# Patient Record
Sex: FEMALE | Race: WHITE | NOT HISPANIC OR LATINO | Employment: FULL TIME | URBAN - METROPOLITAN AREA
[De-identification: names, ages, dates, MRNs, and addresses within clinical notes are randomized per-mention and may not be internally consistent; named-entity substitution may affect disease eponyms.]

---

## 2019-06-07 PROBLEM — N60.92 ATYPICAL DUCTAL HYPERPLASIA OF LEFT BREAST: Status: ACTIVE | Noted: 2019-06-07

## 2019-08-15 PROBLEM — D05.12 INTRADUCTAL CARCINOMA IN SITU OF LEFT BREAST: Status: ACTIVE | Noted: 2019-08-15

## 2019-09-04 ENCOUNTER — INITIAL CONSULT (OUTPATIENT)
Dept: HEMATOLOGY/ONCOLOGY | Facility: CLINIC | Age: 49
End: 2019-09-04
Payer: COMMERCIAL

## 2019-09-04 VITALS
BODY MASS INDEX: 22.11 KG/M2 | HEIGHT: 60 IN | SYSTOLIC BLOOD PRESSURE: 99 MMHG | TEMPERATURE: 98 F | DIASTOLIC BLOOD PRESSURE: 66 MMHG | RESPIRATION RATE: 16 BRPM | WEIGHT: 112.63 LBS | HEART RATE: 87 BPM

## 2019-09-04 DIAGNOSIS — Z90.13 S/P BILATERAL MASTECTOMY: ICD-10-CM

## 2019-09-04 DIAGNOSIS — D05.12 INTRADUCTAL CARCINOMA IN SITU OF LEFT BREAST: Primary | ICD-10-CM

## 2019-09-04 DIAGNOSIS — N60.92 ATYPICAL DUCTAL HYPERPLASIA OF LEFT BREAST: ICD-10-CM

## 2019-09-04 DIAGNOSIS — Z84.89 FAMILY HISTORY OF CARCINOMA IN SITU OF BREAST: ICD-10-CM

## 2019-09-04 PROCEDURE — 99999 PR PBB SHADOW E&M-EST. PATIENT-LVL IV: CPT | Mod: PBBFAC,,, | Performed by: INTERNAL MEDICINE

## 2019-09-04 PROCEDURE — 99205 PR OFFICE/OUTPT VISIT, NEW, LEVL V, 60-74 MIN: ICD-10-PCS | Mod: S$GLB,,, | Performed by: INTERNAL MEDICINE

## 2019-09-04 PROCEDURE — 99999 PR PBB SHADOW E&M-EST. PATIENT-LVL IV: ICD-10-PCS | Mod: PBBFAC,,, | Performed by: INTERNAL MEDICINE

## 2019-09-04 PROCEDURE — 3008F BODY MASS INDEX DOCD: CPT | Mod: CPTII,S$GLB,, | Performed by: INTERNAL MEDICINE

## 2019-09-04 PROCEDURE — 3008F PR BODY MASS INDEX (BMI) DOCUMENTED: ICD-10-PCS | Mod: CPTII,S$GLB,, | Performed by: INTERNAL MEDICINE

## 2019-09-04 PROCEDURE — 99205 OFFICE O/P NEW HI 60 MIN: CPT | Mod: S$GLB,,, | Performed by: INTERNAL MEDICINE

## 2019-09-04 RX ORDER — CLINDAMYCIN HYDROCHLORIDE 300 MG/1
CAPSULE ORAL
Refills: 0 | COMMUNITY
Start: 2019-08-23 | End: 2021-10-13

## 2019-09-04 NOTE — LETTER
September 4, 2019      Lucia Lancaster MD  [address] [facility] [address]           [facility] [address] [facility] [address]  [facility] [address]  Phone: [phone]  Fax: [phone]          Patient: Jenny Baugh   MR Number: [id number]   YOB: 1970   Date of Visit: 9/4/2019       Dear Dr. Lucia Lancaster:    Thank you for referring Jenny Baugh to me for evaluation. Attached you will find relevant portions of my assessment and plan of care.    If you have questions, please do not hesitate to call me. I look forward to following Jenny Baugh along with you.    Sincerely,    Ninoska Saleh MD    Enclosure  CC:  No Recipients    If you would like to receive this communication electronically, please contact [email or web address] or [phone] to request more information on [employer] access.    For providers and/or their staff who would like to refer a patient to [facility], please contact us through our one-stop-shop provider referral line, [facility], at [phone].    If you feel you have received this communication in error or would no longer like to receive these types of communications, please e-mail [email or web address]

## 2019-09-04 NOTE — PROGRESS NOTES
INITIAL CONSULTATION     DATE: 2019  Patient Name: Jenny Baugh  Referred by: Lucia Lancaster,*  Reason for referral: DCIS, ALH      Subjective:       Patient ID: Jenny Baugh is a 48 y.o. female.    Chief Complaint: Cancer (breast )    HPI    16 - Breast MRI 1.2cm well defined mass oter centeral left posterior - R breast. No biopsy done at that time.   19 - suspicious calcs in UOQ L breast. MMG screen  19 - MMG diag L with angie - new 6 mm round, punctacte calc in a tightly clustered distribution in UOQ L breast.    19 - LUQ posterior breast core needle biopsies - flat epitehial atypia, calcs present, no maligancy.    She reports hematoma after the biopsy so MRI was delayed.   19 - MRI breast bilateral - no masses, enhancements, or other abnormal findings are seen. Benign - 2  19 - wide excision core needle biopsy -    Path - DCIS ER negative. Low grade. No necrosis. Negative but close margin 1mm. LCIS with negative margins.   19 - genetic testing - VUS - MIKE and PTCH1.     8/15/19 - Bilateral mastectomies nipple sparing with L SLND  19 - path:atypical lobular hyperplasia, 2 benign LN. ALH is noted on right and left retroareolar excision as well as bilateral breasts.     PMHx:   and no major med problems. 3 pregnancies and children.  Chronic constipation and dependent on laxative once a week. She has no regular meds otherwise. Pain pills gave her a HA.   No chronic pain except infrequent sciatic n and related to exercise.   Exercise includes running, lift weights, kickboxing, HIT.     Works full time -  for, sedentary job. Good stamina.     Menstrual periods - She stopped OCP before surgery 19. Menstruation then started early and then she now hasn't had a period since July. Plan is to see her gyn to discuss, we reviewed today age and risk for VTE on OCP and risk as related to cancer which I think is overall very low  here. famly     Family history - sister had DCIS at 47yo and had mastectomy ipsilateral. ER+. This was approximately . Also has abnormality on kidney that is being worked up. She had vulvar cancer and surgery for that. She is diabetic.   Parents did not have cancer - father had emphysema after tobacco abuse and  at age 63, and mother was diabetic and had CAD and CABG, she  at 66yo. She has one brother who  12yo from thalassemia. She had another brother with a brain aneurysm in his 50's. Third brother had a stroke recently after back surgery at 60yo.     Maternal first cousin in 40's had DCIS. Maternal grandfather  of colon cancer.      No smoking, social drinking on weekends  Exercise regularly except for with surgery.     Past Medical History:   Diagnosis Date    Chronic constipation     General anesthetics causing adverse effect in therapeutic use     Respiratory distress     sister    Sciatica      Past Surgical History:   Procedure Laterality Date    BIOPSY, LYMPH NODE, SENTINEL Left 8/15/2019    [facility] at [facility]     SECTION      EXCISION, MASS, BREAST/ 2:00 with Needle loc Left 2019    Performed by Lucia Lancaster [facility]    MASTECTOMY, SIMPLE - Nipple Sparing Bilateral 8/15/2019    [facility] at [facility]    RECONSTRUCTION, BREAST - with Immediate Silicone Implants Bilateral 8/15/2019    Performed by Thang White MD at [facility] OR     Social History     Socioeconomic History    Marital status:      Spouse name: Not on file    Number of children: Not on file    Years of education: Not on file    Highest education level: Not on file   Occupational History    Not on file   Social Needs    Financial resource strain: Not on file    Food insecurity:     Worry: Not on file     Inability: Not on file    Transportation needs:     Medical: Not on file     Non-medical: Not on file   Tobacco Use     Smoking status: Former Smoker     Last attempt to quit:      Years since quittin.6    Smokeless tobacco: Never Used   Substance and Sexual Activity    Alcohol use: Yes     Frequency: Never     Comment: socially    Drug use: No    Sexual activity: Not on file   Lifestyle    Physical activity:     Days per week: Not on file     Minutes per session: Not on file    Stress: Not on file   Relationships    Social connections:     Talks on phone: Not on file     Gets together: Not on file     Attends Mandaeism service: Not on file     Active member of club or organization: Not on file     Attends meetings of clubs or organizations: Not on file     Relationship status: Not on file   Other Topics Concern    Not on file   Social History Narrative    Not on file     Family History   Problem Relation Age of Onset    Breast cancer Sister 48    Diabetes Mother     Heart disease Mother     Emphysema Father     Diabetes Father        Current Outpatient Medications   Medication Sig Dispense Refill    bisacodyl (DULCOLAX) 5 mg EC tablet Take 5 mg by mouth once a week.      clindamycin (CLEOCIN) 300 MG capsule TK ONE C PO  TID  0    HYDROcodone-acetaminophen (NORCO) 5-325 mg per tablet Take 1 tablet by mouth every 4 to 6 hours as needed for Pain. 40 tablet 0     No current facility-administered medications for this visit.      Facility-Administered Medications Ordered in Other Visits   Medication Dose Route Frequency Provider Last Rate Last Dose    lactated ringers infusion   Intravenous Continuous Piero Chin MD 0 mL/hr at 19 1350       ALLERGIES REVIEWED    Review of Systems   Constitutional: Negative for activity change, appetite change, fatigue, fever and unexpected weight change.   HENT: Negative for congestion, mouth sores, rhinorrhea, sore throat and trouble swallowing.    Eyes: Negative for pain and visual disturbance.   Respiratory: Negative for cough, shortness of breath and  wheezing.    Cardiovascular: Negative for chest pain, palpitations and leg swelling.   Gastrointestinal: Negative for abdominal distention, abdominal pain, blood in stool, constipation, diarrhea, nausea and vomiting.   Genitourinary: Negative for difficulty urinating, dysuria, frequency, hematuria and urgency.   Musculoskeletal: Negative for arthralgias, back pain, joint swelling and myalgias.   Skin: Negative for rash and wound.   Neurological: Negative for dizziness, speech difficulty, weakness, light-headedness, numbness and headaches.   Hematological: Negative for adenopathy. Does not bruise/bleed easily.   Psychiatric/Behavioral: Negative for confusion. The patient is not nervous/anxious.          Objective:      BP 99/66   Pulse 87   Temp 97.8 °F (36.6 °C)   Resp 16   Ht 5' (1.524 m)   Wt 51.1 kg (112 lb 10.5 oz)   BMI 22.00 kg/m²    Wt Readings from Last 25 Encounters:   09/04/19 51.1 kg (112 lb 10.5 oz)   08/15/19 48.1 kg (106 lb 0.7 oz)   08/13/19 48.1 kg (106 lb 0.7 oz)   06/07/19 50.5 kg (111 lb 5.3 oz)   06/07/19 49.9 kg (110 lb)   02/05/19 47.6 kg (105 lb)       Physical Exam   Constitutional: She is oriented to person, place, and time. She appears well-developed and well-nourished. No distress.   HENT:   Head: Normocephalic and atraumatic.   Nose: Nose normal.   Mouth/Throat: Oropharynx is clear and moist. No oropharyngeal exudate.   Eyes: Conjunctivae and EOM are normal. No scleral icterus.   Cardiovascular: Normal rate, regular rhythm, normal heart sounds and intact distal pulses.   No murmur heard.  Pulmonary/Chest: Effort normal and breath sounds normal. No respiratory distress. She has no wheezes. She has no rales. Right breast exhibits no inverted nipple, no mass, no skin change and no tenderness. Left breast exhibits no inverted nipple, no mass, no skin change and no tenderness. No breast swelling or tenderness.   S/p bilateral mastectomies with reconstruction   Abdominal: Soft. Bowel  sounds are normal. She exhibits no distension. There is no tenderness.   Genitourinary: No breast swelling or tenderness.   Musculoskeletal: Normal range of motion. She exhibits no edema.   Lymphadenopathy:     She has no cervical adenopathy.   Neurological: She is alert and oriented to person, place, and time. No cranial nerve deficit.   Skin: Skin is warm and dry. No rash noted.   Psychiatric: She has a normal mood and affect. Her behavior is normal. Thought content normal.             Assessment:       1. Intraductal carcinoma in situ of left breast    2. Atypical ductal hyperplasia of left breast    3. S/P bilateral mastectomy    4. Family history of carcinoma in situ of breast      Cancer Staging  Intraductal carcinoma in situ of left breast  Staging form: Breast, AJCC 8th Edition  - Pathologic stage from 9/4/2019: Stage 0 (pTis (DCIS), pN0, cM0, ER-, AR-, HER2: Not Assessed) - Unsigned    ECOG SCORE         ECOG 0    Patient is a  48 y.o. female here with new diagnosis DCIS s/p bilateral nipple-sparing mastectomies and found to have atypical lobular hyperplasia including margin at nipples.    Discussed diagnosis, staging, receptor status, various treatments including surgery, chemotherapy, radiation, endocrine therapy. Discussed treatment plan and benefits/risks, rationale for recommendations.     Since she is s/p bilateral mastectomies, she has overall very low risk of recurrence of any malignant breast pathology. we discussed risk reduction with oral therapies tamoxifen or AI and context of the clinical trials for ALH and for DCIS that showed benefit and as this relates to her (and her DCIS is ER-). We discussed in detail side effect profile of tamoxifen and benefit/risk ratio. She likely has very low margin of benefit with tamoxifen versus the risk of SE. We discussed if she did have development of DCIS or invasive cancer as would be likely related to nipple-areola and surveillance for this, SBE, continuing  follow-up with surgeon and excision if this occurred, imaging as indicated, again very low risk for any development of more advanced cancer. Discussed consider further surgery to remove nipples which she declines, I think this is a reasonable decision. She expressed good understanding of all above rationale.     Reviewed her family history and genetic testing in detail.     Discussed benefits of exercise, active lifestyle, and healthy body weight (BMI 20-25) in decreasing risk of breast cancer recurrence and advised exercise and diet. She expressed understanding as has healthy lifestyle frequent exercise prior to surgeries this year, very motivated to restart when cleared which is great.           Plan:       Jenny Shaver was seen today for cancer.    Diagnoses and all orders for this visit:    Intraductal carcinoma in situ of left breast    Atypical ductal hyperplasia of left breast    S/P bilateral mastectomy    Family history of carcinoma in situ of breast            PLAN:     MD follow-up here prn.     Important to continue follow-up with surgery for surveillance.    Exercise/nutrition    Important to keep follow-up with PCP and gyn as well and she will discuss OCP and benefit/risk with her gyn.    Discussed plan above in great detail with patient and all questions answered to her satisfaction. Proceed with plan above.       Thank you for the referral. Please call with any questions.           Ninoska Saleh M.D.  Hematology Oncology  [facility]

## 2020-07-02 ENCOUNTER — OFFICE VISIT (OUTPATIENT)
Dept: URGENT CARE | Facility: CLINIC | Age: 50
End: 2020-07-02
Payer: COMMERCIAL

## 2020-07-02 VITALS — TEMPERATURE: 99 F

## 2020-09-08 ENCOUNTER — OFFICE VISIT (OUTPATIENT)
Dept: URGENT CARE | Facility: CLINIC | Age: 50
End: 2020-09-08
Payer: COMMERCIAL

## 2020-09-08 VITALS
WEIGHT: 112.63 LBS | DIASTOLIC BLOOD PRESSURE: 62 MMHG | HEIGHT: 60 IN | OXYGEN SATURATION: 98 % | HEART RATE: 88 BPM | BODY MASS INDEX: 22.11 KG/M2 | SYSTOLIC BLOOD PRESSURE: 106 MMHG | RESPIRATION RATE: 18 BRPM | TEMPERATURE: 98 F

## 2020-09-08 DIAGNOSIS — R09.82 PND (POST-NASAL DRIP): ICD-10-CM

## 2020-09-08 DIAGNOSIS — R05.9 COUGH: ICD-10-CM

## 2020-09-08 DIAGNOSIS — R09.81 SINUS CONGESTION: Primary | ICD-10-CM

## 2020-09-08 LAB
CTP QC/QA: YES
SARS-COV-2 RDRP RESP QL NAA+PROBE: NEGATIVE

## 2020-09-08 PROCEDURE — 96372 THER/PROPH/DIAG INJ SC/IM: CPT | Mod: S$GLB,,, | Performed by: FAMILY MEDICINE

## 2020-09-08 PROCEDURE — 96372 PR INJECTION,THERAP/PROPH/DIAG2ST, IM OR SUBCUT: ICD-10-PCS | Mod: S$GLB,,, | Performed by: FAMILY MEDICINE

## 2020-09-08 PROCEDURE — 99204 OFFICE O/P NEW MOD 45 MIN: CPT | Mod: 25,S$GLB,, | Performed by: PHYSICIAN ASSISTANT

## 2020-09-08 PROCEDURE — 99204 PR OFFICE/OUTPT VISIT, NEW, LEVL IV, 45-59 MIN: ICD-10-PCS | Mod: 25,S$GLB,, | Performed by: PHYSICIAN ASSISTANT

## 2020-09-08 PROCEDURE — U0002: ICD-10-PCS | Mod: QW,S$GLB,, | Performed by: PHYSICIAN ASSISTANT

## 2020-09-08 PROCEDURE — U0002 COVID-19 LAB TEST NON-CDC: HCPCS | Mod: QW,S$GLB,, | Performed by: PHYSICIAN ASSISTANT

## 2020-09-08 RX ORDER — AZELASTINE 1 MG/ML
1 SPRAY, METERED NASAL 2 TIMES DAILY
Qty: 30 ML | Refills: 3 | Status: SHIPPED | OUTPATIENT
Start: 2020-09-08 | End: 2021-10-13

## 2020-09-08 RX ORDER — DEXAMETHASONE SODIUM PHOSPHATE 100 MG/10ML
10 INJECTION INTRAMUSCULAR; INTRAVENOUS ONCE
Status: COMPLETED | OUTPATIENT
Start: 2020-09-08 | End: 2020-09-08

## 2020-09-08 RX ADMIN — DEXAMETHASONE SODIUM PHOSPHATE 10 MG: 100 INJECTION INTRAMUSCULAR; INTRAVENOUS at 01:09

## 2020-09-08 NOTE — PATIENT INSTRUCTIONS
Sinusitis (No Antibiotics)    The sinuses are air-filled spaces within the bones of the face. They connect to the inside of the nose. Sinusitis is an inflammation of the tissue lining the sinus cavity. Sinus inflammation can occur during a cold. It can also be due to allergies to pollens and other particles in the air. It can cause symptoms such as sinus congestion, headache, sore throat, facial swelling and fullness. It may also cause a low-grade fever. No infection is present, and no antibiotic treatment is needed.  Home care  · Drink plenty of water, hot tea, and other liquids. This may help thin mucus. It also may promote sinus drainage.  · Heat may help soothe painful areas of the face. Use a towel soaked in hot water. Or,  the shower and direct the hot spray onto your face. Using a vaporizer along with a menthol rub at night may also help.   · An expectorant containing guaifenesin may help thin the mucus and promote drainage from the sinuses.  · Over-the-counter decongestants may be used unless a similar medicine was prescribed. Nasal sprays work the fastest. Use one that contains phenylephrine or oxymetazoline. First blow the nose gently. Then use the spray. Do not use these medicines more often than directed on the label or symptoms may get worse. You may also use tablets containing pseudoephedrine. Avoid products that combine ingredients, because side effects may be increased. Read labels. You can also ask the pharmacist for help. (NOTE: Persons with high blood pressure should not use decongestants. They can raise blood pressure.)  · Over-the-counter antihistamines may help if allergies contributed to your sinusitis.    · Use acetaminophen or ibuprofen to control pain, unless another pain medicine was prescribed. (If you have chronic liver or kidney disease or ever had a stomach ulcer, talk with your doctor before using these medicines. Aspirin should never be used in anyone under 18 years of age  "who is ill with a fever. It may cause severe liver damage.)  · Use nasal rinses or irrigation as instructed by your health care provider.  · Don't smoke. This can worsen symptoms.  Follow-up care  Follow up with your healthcare provider or our staff if you are not improving within the next week.  When to seek medical advice  Call your healthcare provider if any of these occur:  · Green or yellow discharge from the nose or into the throat  · Facial pain or headache becoming more severe  · Stiff neck  · Unusual drowsiness or confusion  · Swelling of the forehead or eyelids  · Vision problems, including blurred or double vision  · Fever of 100.4ºF (38ºC) or higher, or as directed by your healthcare provider  · Seizure  · Breathing problems  · Symptoms not resolving within 10 days  Date Last Reviewed: 4/13/2015  © 0401-7421 [employer]. [address] [place] [address]. All rights reserved. This information is not intended as a substitute for professional medical care. Always follow your healthcare professional's instructions.    NASAL ALLERGY    Nasal Allergy, also called "Allergic Rhinitis" occurs after exposure to pollen, molds, mildew, animal "dander" (scales from animal skin, hair and feathers), dust, smoke and fumes. (These are called "allergens"). When pollen causes a nasal allergy it is commonly called "Hay Fever".    When these particles contact the lining of the nose, eyes, eyelids, sinuses or throat, they cause the cells to release a chemical called "histamine". Histamine may cause a watery discharge from the eyes or nose. It may also cause violent sneezing, nasal congestion, itching of the eyes, nose, throat and mouth.    PREVENTION:    Nasal allergy cannot be cured but symptoms can be reduced. Avoid or reduce exposure to the allergen when possible.    HOME CARE:    1) DECONGESTANT pills and sprays (Sudafed, NeoSynephrine), reduce tissue swelling and watery discharge. Overuse of nasal " decongestant sprays may make symptoms worse, ESPECIALLY IF YOU HAVE HIGH BLOOD PRESSURE. Do not use these more often than recommended. Get an over the counter Nasal Saline spray to supplement Flonase    2) ANTIHISTAMINES block the release of histamine during the allergic response. Antihistamines are more effective when taken BEFORE symptoms develop. Unless a prescription antihistamine was prescribed, you may take CLARITIN (loratadine). (Claritin is an over-the-counter antihistamine that does not cause drowsiness.) You can also consider Xyzal, Allegra, Zyrtec.    3) STEROID nasal sprays (Beconase, Vancenase, Nasalide, Nasocort, Flonase) or oral steroids (Prednisone) may also be prescribed for more severe symptoms. These help to reduce the local inflammation which adds to the allergic response.    4) If you have ASTHMA, pollen season may make your asthma symptoms worse. It is important that you use your asthma medicines as directed during this time to prevent or treat attacks. Some persons with asthma have a worsening of their asthma symptoms when taking antihistamines. If you notice this, stop the antihistamines and notify your doctor.    5) Consider a Chitto Rhino Nasal and Sinus Rinse 2-3 times/week if your symptoms are chronic. ([email or web address])    Thank you for enrolling in [facility]. Please follow the instructions below to securely access your online medical record. [employer] allows you to send messages to your doctor, view your test results, renew your prescriptions, schedule appointments, and more.     How Do I Sign Up?  1. In your Internet browser, go to [email or web address].  2. In the lower right of the page, click the Activate Now link located under the Have Access Code? Title.  3. Enter your [facility] Access Code exactly as it appears below. You will not need to use this code after youve completed the sign-up process. If you do not sign up before the expiration date, you must request a new  code.  [facility] Access Code: Activation code not generated  Current Patient Portal Status: Active    4. Enter Date of Birth (mm/dd/yyyy) as indicated and click the Next button. You will be taken to the next sign-up page.  5. Create a [facility] ID. This will be your new [facility] login ID and cannot be changed, so think of one that is secure and easy to remember.  6. Create a [facility] password.  Your password must be at least 8 characters long and contain at least 1 letter and 1 number.  You can change your password at any time.  7. Enter your Password Reset Question and Answer, then click the Next button.   8. Enter your e-mail address. You will receive e-mail notification when new information is available in [facility].  9. Click Sign Up. You can now view your medical record.     Additional Information  If you have questions, you can email [email or web address] or call [phone]  to talk to our [facility] staff. Remember, [facility] is NOT to be used for urgent needs. For medical emergencies, dial 911.     If not allergic,take tylenol (acetominophen) for fever control, chills, or body aches every 4 hours. Do not exceed 4000 mg/ day.If not allergic, take Motrin (Ibuprofen) every 4 hours for fever, chills, pain or inflammation. Do not exceed 2400 mg/day. You can alternate taking tylenol and motrin.    If you were prescribed a narcotic medication, do not drive or operate heavy equipment or machinery while taking these medications.  You must understand that you've received an Urgent Care treatment only and that you may be released before all your medical problems are known or treated. You, the patient, will arrange for follow up care as instructed.  Follow up with your PCP or specialty clinic as directed in the next 1-2 weeks if not improved or as needed.  You can call [phone] to schedule an appointment with the appropriate provider.  If your condition worsens we recommend that you receive another evaluation  at the emergency room immediately or contact your primary medical clinics after hours call service to discuss your concerns.    Please return here or go to the Emergency Department for any concerns or worsening of condition.    If you have been referred to another provider and wish to call to check on the status of your referral, please call [facility] at [phone]

## 2020-09-08 NOTE — PROGRESS NOTES
Subjective:       Patient ID: Jenny Baugh is a 49 y.o. female.    Vitals:  height is 5' (1.524 m) and weight is 51.1 kg (112 lb 10.5 oz). Her temperature is 98.1 °F (36.7 °C). Her blood pressure is 106/62 and her pulse is 88. Her respiration is 18 and oxygen saturation is 98%.     Chief Complaint: Sinus congestion     Pt presents to the clinic today for sinus problems, x Sunday. Pt c/o congestion, cough, and fatigue. Pt is taking OTC cold meds.     Sinus Problem  This is a new problem. The current episode started in the past 7 days. The problem is unchanged. There has been no fever. Her pain is at a severity of 0/10. She is experiencing no pain. Associated symptoms include congestion, coughing and sinus pressure. Pertinent negatives include no chills, diaphoresis, ear pain, headaches, shortness of breath or sore throat. Past treatments include oral decongestants. The treatment provided no relief.       Constitution: Negative for chills, sweating, fatigue and fever.   HENT: Positive for congestion and sinus pressure. Negative for ear pain, sinus pain, sore throat and voice change.    Neck: Negative for painful lymph nodes.   Eyes: Negative for eye redness.   Respiratory: Positive for cough. Negative for chest tightness, sputum production, bloody sputum, COPD, shortness of breath, stridor, wheezing and asthma.    Gastrointestinal: Negative for nausea, vomiting and diarrhea.   Musculoskeletal: Positive for muscle ache.   Skin: Negative for rash.   Allergic/Immunologic: Negative for seasonal allergies and asthma.   Neurological: Negative for headaches.   Hematologic/Lymphatic: Negative for swollen lymph nodes.       Objective:      Physical Exam   Constitutional: She is oriented to person, place, and time. She appears well-developed. She is cooperative.  Non-toxic appearance. She does not appear ill. No distress.   HENT:   Head: Normocephalic and atraumatic.   Ears:   Right Ear: Hearing, tympanic membrane,  "external ear and ear canal normal.   Left Ear: Hearing, tympanic membrane, external ear and ear canal normal.   Nose: No mucosal edema, rhinorrhea or nasal deformity. No epistaxis. Right sinus exhibits maxillary sinus tenderness. Right sinus exhibits no frontal sinus tenderness. Left sinus exhibits maxillary sinus tenderness (mild "soreness"). Left sinus exhibits no frontal sinus tenderness.   Mouth/Throat: Uvula is midline, oropharynx is clear and moist and mucous membranes are normal. No trismus in the jaw. Normal dentition. No uvula swelling. Cobblestoning present. No oropharyngeal exudate, posterior oropharyngeal edema or posterior oropharyngeal erythema.   Eyes: Conjunctivae and lids are normal. No scleral icterus.   Neck: Trachea normal, full passive range of motion without pain and phonation normal. Neck supple. No neck rigidity. No edema and no erythema present.   Cardiovascular: Normal rate, regular rhythm, normal heart sounds and normal pulses.   Pulmonary/Chest: Effort normal and breath sounds normal. No respiratory distress. She has no decreased breath sounds. She has no rhonchi.   Abdominal: Normal appearance.   Musculoskeletal: Normal range of motion.         General: No deformity.   Neurological: She is alert and oriented to person, place, and time. She exhibits normal muscle tone. Coordination normal.   Skin: Skin is warm, dry, intact, not diaphoretic and not pale. Psychiatric: Her speech is normal and behavior is normal. Judgment and thought content normal.   Nursing note and vitals reviewed.        Assessment:       1. Sinus congestion    2. Cough    3. PND (post-nasal drip)        Plan:         Sinus congestion  -     dexamethasone injection 10 mg  -     azelastine (ASTELIN) 137 mcg (0.1 %) nasal spray; 1 spray (137 mcg total) by Nasal route 2 (two) times daily.  Dispense: 30 mL; Refill: 3  -     sodium chloride (OCEAN NASAL) 0.65 % nasal spray; 1 spray by Nasal route as needed for Congestion.  " Dispense: 45 mL; Refill: 3    Cough  -     POCT COVID-19 Rapid Screening    PND (post-nasal drip)    All applicable EKG, medical records, labs, imaging reviewed in Epic and discussed with patient.     poct COVID (-)     Patient Instructions     Sinusitis (No Antibiotics)    The sinuses are air-filled spaces within the bones of the face. They connect to the inside of the nose. Sinusitis is an inflammation of the tissue lining the sinus cavity. Sinus inflammation can occur during a cold. It can also be due to allergies to pollens and other particles in the air. It can cause symptoms such as sinus congestion, headache, sore throat, facial swelling and fullness. It may also cause a low-grade fever. No infection is present, and no antibiotic treatment is needed.  Home care  · Drink plenty of water, hot tea, and other liquids. This may help thin mucus. It also may promote sinus drainage.  · Heat may help soothe painful areas of the face. Use a towel soaked in hot water. Or,  the shower and direct the hot spray onto your face. Using a vaporizer along with a menthol rub at night may also help.   · An expectorant containing guaifenesin may help thin the mucus and promote drainage from the sinuses.  · Over-the-counter decongestants may be used unless a similar medicine was prescribed. Nasal sprays work the fastest. Use one that contains phenylephrine or oxymetazoline. First blow the nose gently. Then use the spray. Do not use these medicines more often than directed on the label or symptoms may get worse. You may also use tablets containing pseudoephedrine. Avoid products that combine ingredients, because side effects may be increased. Read labels. You can also ask the pharmacist for help. (NOTE: Persons with high blood pressure should not use decongestants. They can raise blood pressure.)  · Over-the-counter antihistamines may help if allergies contributed to your sinusitis.    · Use acetaminophen or ibuprofen to  "control pain, unless another pain medicine was prescribed. (If you have chronic liver or kidney disease or ever had a stomach ulcer, talk with your doctor before using these medicines. Aspirin should never be used in anyone under 18 years of age who is ill with a fever. It may cause severe liver damage.)  · Use nasal rinses or irrigation as instructed by your health care provider.  · Don't smoke. This can worsen symptoms.  Follow-up care  Follow up with your healthcare provider or our staff if you are not improving within the next week.  When to seek medical advice  Call your healthcare provider if any of these occur:  · Green or yellow discharge from the nose or into the throat  · Facial pain or headache becoming more severe  · Stiff neck  · Unusual drowsiness or confusion  · Swelling of the forehead or eyelids  · Vision problems, including blurred or double vision  · Fever of 100.4ºF (38ºC) or higher, or as directed by your healthcare provider  · Seizure  · Breathing problems  · Symptoms not resolving within 10 days  Date Last Reviewed: 4/13/2015 © 2000-2017 [employer]. [address] [place] [address]. All rights reserved. This information is not intended as a substitute for professional medical care. Always follow your healthcare professional's instructions.    NASAL ALLERGY    Nasal Allergy, also called "Allergic Rhinitis" occurs after exposure to pollen, molds, mildew, animal "dander" (scales from animal skin, hair and feathers), dust, smoke and fumes. (These are called "allergens"). When pollen causes a nasal allergy it is commonly called "Hay Fever".    When these particles contact the lining of the nose, eyes, eyelids, sinuses or throat, they cause the cells to release a chemical called "histamine". Histamine may cause a watery discharge from the eyes or nose. It may also cause violent sneezing, nasal congestion, itching of the eyes, nose, throat and mouth.    PREVENTION:    Nasal " allergy cannot be cured but symptoms can be reduced. Avoid or reduce exposure to the allergen when possible.    HOME CARE:    1) DECONGESTANT pills and sprays (Sudafed, NeoSynephrine), reduce tissue swelling and watery discharge. Overuse of nasal decongestant sprays may make symptoms worse, ESPECIALLY IF YOU HAVE HIGH BLOOD PRESSURE. Do not use these more often than recommended. Get an over the counter Nasal Saline spray to supplement Flonase    2) ANTIHISTAMINES block the release of histamine during the allergic response. Antihistamines are more effective when taken BEFORE symptoms develop. Unless a prescription antihistamine was prescribed, you may take CLARITIN (loratadine). (Claritin is an over-the-counter antihistamine that does not cause drowsiness.) You can also consider Xyzal, Allegra, Zyrtec.    3) STEROID nasal sprays (Beconase, Vancenase, Nasalide, Nasocort, Flonase) or oral steroids (Prednisone) may also be prescribed for more severe symptoms. These help to reduce the local inflammation which adds to the allergic response.    4) If you have ASTHMA, pollen season may make your asthma symptoms worse. It is important that you use your asthma medicines as directed during this time to prevent or treat attacks. Some persons with asthma have a worsening of their asthma symptoms when taking antihistamines. If you notice this, stop the antihistamines and notify your doctor.    5) Consider a Ch[place] Rhino Nasal and Sinus Rinse 2-3 times/week if your symptoms are chronic. ([email or web address])    Thank you for enrolling in [facility]. Please follow the instructions below to securely access your online medical record. [employer] allows you to send messages to your doctor, view your test results, renew your prescriptions, schedule appointments, and more.     How Do I Sign Up?  1. In your Internet browser, go to [email or web address].  2. In the lower right of the page, click the Activate Now link located under the Have  Access Code? Title.  3. Enter your [facility] Access Code exactly as it appears below. You will not need to use this code after youve completed the sign-up process. If you do not sign up before the expiration date, you must request a new code.  [facility] Access Code: Activation code not generated  Current Patient Portal Status: Active    4. Enter Date of Birth (mm/dd/yyyy) as indicated and click the Next button. You will be taken to the next sign-up page.  5. Create a [facility] ID. This will be your new [facility] login ID and cannot be changed, so think of one that is secure and easy to remember.  6. Create a [facility] password.  Your password must be at least 8 characters long and contain at least 1 letter and 1 number.  You can change your password at any time.  7. Enter your Password Reset Question and Answer, then click the Next button.   8. Enter your e-mail address. You will receive e-mail notification when new information is available in [facility].  9. Click Sign Up. You can now view your medical record.     Additional Information  If you have questions, you can email [email or web address] or call [phone]  to talk to our [facility] staff. Remember, [facility] is NOT to be used for urgent needs. For medical emergencies, dial 911.     If not allergic,take tylenol (acetominophen) for fever control, chills, or body aches every 4 hours. Do not exceed 4000 mg/ day.If not allergic, take Motrin (Ibuprofen) every 4 hours for fever, chills, pain or inflammation. Do not exceed 2400 mg/day. You can alternate taking tylenol and motrin.    If you were prescribed a narcotic medication, do not drive or operate heavy equipment or machinery while taking these medications.  You must understand that you've received an Urgent Care treatment only and that you may be released before all your medical problems are known or treated. You, the patient, will arrange for follow up care as instructed.  Follow up with your PCP or  specialty clinic as directed in the next 1-2 weeks if not improved or as needed.  You can call [phone] to schedule an appointment with the appropriate provider.  If your condition worsens we recommend that you receive another evaluation at the emergency room immediately or contact your primary medical clinics after hours call service to discuss your concerns.    Please return here or go to the Emergency Department for any concerns or worsening of condition.    If you have been referred to another provider and wish to call to check on the status of your referral, please call [facility] Sched[facility] at [phone]

## 2020-09-25 PROBLEM — D05.02 LOBULAR CARCINOMA IN SITU OF LEFT BREAST: Status: ACTIVE | Noted: 2020-09-25

## 2020-09-25 PROBLEM — Z80.3 FAMILY HISTORY OF BREAST CANCER: Status: ACTIVE | Noted: 2020-09-25

## 2021-05-10 ENCOUNTER — PATIENT MESSAGE (OUTPATIENT)
Dept: RESEARCH | Facility: HOSPITAL | Age: 51
End: 2021-05-10

## 2021-09-22 ENCOUNTER — OFFICE VISIT (OUTPATIENT)
Dept: NEUROSURGERY | Facility: CLINIC | Age: 51
End: 2021-09-22
Payer: COMMERCIAL

## 2021-09-22 VITALS
HEIGHT: 60 IN | BODY MASS INDEX: 21.99 KG/M2 | DIASTOLIC BLOOD PRESSURE: 60 MMHG | WEIGHT: 112 LBS | SYSTOLIC BLOOD PRESSURE: 114 MMHG

## 2021-09-22 DIAGNOSIS — R29.2 HYPERREFLEXIA: ICD-10-CM

## 2021-09-22 DIAGNOSIS — M54.12 CERVICAL RADICULOPATHY: Primary | ICD-10-CM

## 2021-09-22 PROCEDURE — 99999 PR PBB SHADOW E&M-EST. PATIENT-LVL III: ICD-10-PCS | Mod: PBBFAC,,, | Performed by: PHYSICIAN ASSISTANT

## 2021-09-22 PROCEDURE — 3008F BODY MASS INDEX DOCD: CPT | Mod: CPTII,S$GLB,, | Performed by: PHYSICIAN ASSISTANT

## 2021-09-22 PROCEDURE — 99999 PR PBB SHADOW E&M-EST. PATIENT-LVL III: CPT | Mod: PBBFAC,,, | Performed by: PHYSICIAN ASSISTANT

## 2021-09-22 PROCEDURE — 1159F MED LIST DOCD IN RCRD: CPT | Mod: CPTII,S$GLB,, | Performed by: PHYSICIAN ASSISTANT

## 2021-09-22 PROCEDURE — 99204 OFFICE O/P NEW MOD 45 MIN: CPT | Mod: S$GLB,,, | Performed by: PHYSICIAN ASSISTANT

## 2021-09-22 PROCEDURE — 1160F PR REVIEW ALL MEDS BY PRESCRIBER/CLIN PHARMACIST DOCUMENTED: ICD-10-PCS | Mod: CPTII,S$GLB,, | Performed by: PHYSICIAN ASSISTANT

## 2021-09-22 PROCEDURE — 3078F DIAST BP <80 MM HG: CPT | Mod: CPTII,S$GLB,, | Performed by: PHYSICIAN ASSISTANT

## 2021-09-22 PROCEDURE — 3074F PR MOST RECENT SYSTOLIC BLOOD PRESSURE < 130 MM HG: ICD-10-PCS | Mod: CPTII,S$GLB,, | Performed by: PHYSICIAN ASSISTANT

## 2021-09-22 PROCEDURE — 1160F RVW MEDS BY RX/DR IN RCRD: CPT | Mod: CPTII,S$GLB,, | Performed by: PHYSICIAN ASSISTANT

## 2021-09-22 PROCEDURE — 3074F SYST BP LT 130 MM HG: CPT | Mod: CPTII,S$GLB,, | Performed by: PHYSICIAN ASSISTANT

## 2021-09-22 PROCEDURE — 3078F PR MOST RECENT DIASTOLIC BLOOD PRESSURE < 80 MM HG: ICD-10-PCS | Mod: CPTII,S$GLB,, | Performed by: PHYSICIAN ASSISTANT

## 2021-09-22 PROCEDURE — 3008F PR BODY MASS INDEX (BMI) DOCUMENTED: ICD-10-PCS | Mod: CPTII,S$GLB,, | Performed by: PHYSICIAN ASSISTANT

## 2021-09-22 PROCEDURE — 1159F PR MEDICATION LIST DOCUMENTED IN MEDICAL RECORD: ICD-10-PCS | Mod: CPTII,S$GLB,, | Performed by: PHYSICIAN ASSISTANT

## 2021-09-22 PROCEDURE — 99204 PR OFFICE/OUTPT VISIT, NEW, LEVL IV, 45-59 MIN: ICD-10-PCS | Mod: S$GLB,,, | Performed by: PHYSICIAN ASSISTANT

## 2021-10-08 ENCOUNTER — TELEPHONE (OUTPATIENT)
Dept: NEUROSURGERY | Facility: CLINIC | Age: 51
End: 2021-10-08

## 2021-10-08 DIAGNOSIS — M54.12 CERVICAL RADICULOPATHY: Primary | ICD-10-CM

## 2021-10-08 RX ORDER — MELOXICAM 7.5 MG/1
7.5 TABLET ORAL DAILY PRN
Qty: 14 TABLET | Refills: 0 | Status: ON HOLD | OUTPATIENT
Start: 2021-10-08 | End: 2022-01-19 | Stop reason: HOSPADM

## 2021-10-08 RX ORDER — GABAPENTIN 300 MG/1
300 CAPSULE ORAL NIGHTLY
Qty: 30 CAPSULE | Refills: 2 | Status: SHIPPED | OUTPATIENT
Start: 2021-10-08 | End: 2021-12-01 | Stop reason: SDUPTHER

## 2021-10-13 ENCOUNTER — OFFICE VISIT (OUTPATIENT)
Dept: PAIN MEDICINE | Facility: CLINIC | Age: 51
End: 2021-10-13
Payer: COMMERCIAL

## 2021-10-13 VITALS
WEIGHT: 118.63 LBS | HEIGHT: 61 IN | OXYGEN SATURATION: 100 % | HEART RATE: 84 BPM | SYSTOLIC BLOOD PRESSURE: 124 MMHG | DIASTOLIC BLOOD PRESSURE: 64 MMHG | BODY MASS INDEX: 22.4 KG/M2

## 2021-10-13 DIAGNOSIS — M54.12 CERVICAL RADICULOPATHY: Primary | ICD-10-CM

## 2021-10-13 DIAGNOSIS — M47.812 CERVICAL SPONDYLOSIS: ICD-10-CM

## 2021-10-13 DIAGNOSIS — M50.30 DDD (DEGENERATIVE DISC DISEASE), CERVICAL: ICD-10-CM

## 2021-10-13 DIAGNOSIS — Z01.818 PRE-OP TESTING: ICD-10-CM

## 2021-10-13 PROCEDURE — 99999 PR PBB SHADOW E&M-EST. PATIENT-LVL IV: CPT | Mod: PBBFAC,,, | Performed by: ANESTHESIOLOGY

## 2021-10-13 PROCEDURE — 1159F PR MEDICATION LIST DOCUMENTED IN MEDICAL RECORD: ICD-10-PCS | Mod: CPTII,S$GLB,, | Performed by: ANESTHESIOLOGY

## 2021-10-13 PROCEDURE — 99999 PR PBB SHADOW E&M-EST. PATIENT-LVL IV: ICD-10-PCS | Mod: PBBFAC,,, | Performed by: ANESTHESIOLOGY

## 2021-10-13 PROCEDURE — 3078F DIAST BP <80 MM HG: CPT | Mod: CPTII,S$GLB,, | Performed by: ANESTHESIOLOGY

## 2021-10-13 PROCEDURE — 3074F PR MOST RECENT SYSTOLIC BLOOD PRESSURE < 130 MM HG: ICD-10-PCS | Mod: CPTII,S$GLB,, | Performed by: ANESTHESIOLOGY

## 2021-10-13 PROCEDURE — 99204 PR OFFICE/OUTPT VISIT, NEW, LEVL IV, 45-59 MIN: ICD-10-PCS | Mod: CS,S$GLB,, | Performed by: ANESTHESIOLOGY

## 2021-10-13 PROCEDURE — 3008F PR BODY MASS INDEX (BMI) DOCUMENTED: ICD-10-PCS | Mod: CPTII,S$GLB,, | Performed by: ANESTHESIOLOGY

## 2021-10-13 PROCEDURE — 3074F SYST BP LT 130 MM HG: CPT | Mod: CPTII,S$GLB,, | Performed by: ANESTHESIOLOGY

## 2021-10-13 PROCEDURE — 1159F MED LIST DOCD IN RCRD: CPT | Mod: CPTII,S$GLB,, | Performed by: ANESTHESIOLOGY

## 2021-10-13 PROCEDURE — 99204 OFFICE O/P NEW MOD 45 MIN: CPT | Mod: CS,S$GLB,, | Performed by: ANESTHESIOLOGY

## 2021-10-13 PROCEDURE — 3078F PR MOST RECENT DIASTOLIC BLOOD PRESSURE < 80 MM HG: ICD-10-PCS | Mod: CPTII,S$GLB,, | Performed by: ANESTHESIOLOGY

## 2021-10-13 PROCEDURE — 3008F BODY MASS INDEX DOCD: CPT | Mod: CPTII,S$GLB,, | Performed by: ANESTHESIOLOGY

## 2021-10-13 RX ORDER — SODIUM CHLORIDE, SODIUM LACTATE, POTASSIUM CHLORIDE, CALCIUM CHLORIDE 600; 310; 30; 20 MG/100ML; MG/100ML; MG/100ML; MG/100ML
INJECTION, SOLUTION INTRAVENOUS CONTINUOUS
Status: CANCELLED | OUTPATIENT
Start: 2021-10-27

## 2021-10-26 DIAGNOSIS — M54.12 CERVICAL RADICULOPATHY: Primary | ICD-10-CM

## 2021-10-26 RX ORDER — IBUPROFEN 200 MG
200 TABLET ORAL EVERY 6 HOURS PRN
COMMUNITY

## 2021-10-27 ENCOUNTER — HOSPITAL ENCOUNTER (OUTPATIENT)
Facility: HOSPITAL | Age: 51
Discharge: HOME | End: 2021-10-27
Attending: ANESTHESIOLOGY | Admitting: ANESTHESIOLOGY
Payer: COMMERCIAL

## 2021-10-27 ENCOUNTER — HOSPITAL ENCOUNTER (OUTPATIENT)
Dept: RADIOLOGY | Facility: HOSPITAL | Age: 51
Discharge: HOME | End: 2021-10-27
Attending: ANESTHESIOLOGY
Payer: COMMERCIAL

## 2021-10-27 VITALS
TEMPERATURE: 98 F | HEART RATE: 88 BPM | RESPIRATION RATE: 16 BRPM | SYSTOLIC BLOOD PRESSURE: 120 MMHG | DIASTOLIC BLOOD PRESSURE: 68 MMHG | OXYGEN SATURATION: 98 %

## 2021-10-27 DIAGNOSIS — M54.12 CERVICAL RADICULOPATHY: ICD-10-CM

## 2021-10-27 LAB
B-HCG UR QL: NEGATIVE
B-HCG UR QL: NEGATIVE
CTP QC/QA: YES
CTP QC/QA: YES

## 2021-10-27 PROCEDURE — 63600175 PHARM REV CODE 636 W HCPCS: Mod: PO | Performed by: ANESTHESIOLOGY

## 2021-10-27 PROCEDURE — 76000 FLUOROSCOPY <1 HR PHYS/QHP: CPT | Mod: TC,PO

## 2021-10-27 PROCEDURE — 81025 URINE PREGNANCY TEST: CPT | Mod: PO | Performed by: ANESTHESIOLOGY

## 2021-10-27 PROCEDURE — 25500020 PHARM REV CODE 255: Mod: PO | Performed by: ANESTHESIOLOGY

## 2021-10-27 PROCEDURE — 99152 PR MOD CONSCIOUS SEDATION, SAME PHYS, 5+ YRS, FIRST 15 MIN: ICD-10-PCS | Mod: ,,, | Performed by: ANESTHESIOLOGY

## 2021-10-27 PROCEDURE — 99152 MOD SED SAME PHYS/QHP 5/>YRS: CPT | Mod: ,,, | Performed by: ANESTHESIOLOGY

## 2021-10-27 PROCEDURE — 62321 NJX INTERLAMINAR CRV/THRC: CPT | Mod: ,,, | Performed by: ANESTHESIOLOGY

## 2021-10-27 PROCEDURE — 62321 PR INJ CERV/THORAC, W/GUIDANCE: ICD-10-PCS | Mod: ,,, | Performed by: ANESTHESIOLOGY

## 2021-10-27 PROCEDURE — 25000003 PHARM REV CODE 250: Mod: PO | Performed by: ANESTHESIOLOGY

## 2021-10-27 PROCEDURE — 62321 NJX INTERLAMINAR CRV/THRC: CPT | Mod: PO | Performed by: ANESTHESIOLOGY

## 2021-10-27 PROCEDURE — A4216 STERILE WATER/SALINE, 10 ML: HCPCS | Mod: PO | Performed by: ANESTHESIOLOGY

## 2021-10-27 RX ORDER — SODIUM CHLORIDE, SODIUM LACTATE, POTASSIUM CHLORIDE, CALCIUM CHLORIDE 600; 310; 30; 20 MG/100ML; MG/100ML; MG/100ML; MG/100ML
INJECTION, SOLUTION INTRAVENOUS CONTINUOUS
Status: DISCONTINUED | OUTPATIENT
Start: 2021-10-27 | End: 2021-10-27 | Stop reason: HOSPADM

## 2021-10-27 RX ORDER — METHYLPREDNISOLONE ACETATE 80 MG/ML
INJECTION, SUSPENSION INTRA-ARTICULAR; INTRALESIONAL; INTRAMUSCULAR; SOFT TISSUE
Status: DISCONTINUED | OUTPATIENT
Start: 2021-10-27 | End: 2021-10-27 | Stop reason: HOSPADM

## 2021-10-27 RX ORDER — LIDOCAINE HYDROCHLORIDE 10 MG/ML
INJECTION, SOLUTION EPIDURAL; INFILTRATION; INTRACAUDAL; PERINEURAL
Status: DISCONTINUED | OUTPATIENT
Start: 2021-10-27 | End: 2021-10-27 | Stop reason: HOSPADM

## 2021-10-27 RX ORDER — MIDAZOLAM HYDROCHLORIDE 2 MG/2ML
INJECTION, SOLUTION INTRAMUSCULAR; INTRAVENOUS
Status: DISCONTINUED | OUTPATIENT
Start: 2021-10-27 | End: 2021-10-27 | Stop reason: HOSPADM

## 2021-10-27 RX ORDER — SODIUM CHLORIDE 9 MG/ML
INJECTION, SOLUTION INTRAMUSCULAR; INTRAVENOUS; SUBCUTANEOUS
Status: DISCONTINUED | OUTPATIENT
Start: 2021-10-27 | End: 2021-10-27 | Stop reason: HOSPADM

## 2021-10-27 RX ADMIN — SODIUM CHLORIDE, SODIUM LACTATE, POTASSIUM CHLORIDE, AND CALCIUM CHLORIDE: .6; .31; .03; .02 INJECTION, SOLUTION INTRAVENOUS at 02:10

## 2021-12-01 ENCOUNTER — OFFICE VISIT (OUTPATIENT)
Dept: PAIN MEDICINE | Facility: CLINIC | Age: 51
End: 2021-12-01
Payer: COMMERCIAL

## 2021-12-01 VITALS
BODY MASS INDEX: 23.81 KG/M2 | WEIGHT: 121.25 LBS | DIASTOLIC BLOOD PRESSURE: 72 MMHG | SYSTOLIC BLOOD PRESSURE: 118 MMHG | HEART RATE: 87 BPM | HEIGHT: 60 IN

## 2021-12-01 DIAGNOSIS — M50.30 DDD (DEGENERATIVE DISC DISEASE), CERVICAL: ICD-10-CM

## 2021-12-01 DIAGNOSIS — M54.12 CERVICAL RADICULOPATHY: Primary | ICD-10-CM

## 2021-12-01 DIAGNOSIS — M47.812 CERVICAL SPONDYLOSIS: ICD-10-CM

## 2021-12-01 PROCEDURE — 99214 OFFICE O/P EST MOD 30 MIN: CPT | Mod: S$GLB,,, | Performed by: PHYSICIAN ASSISTANT

## 2021-12-01 PROCEDURE — 99999 PR PBB SHADOW E&M-EST. PATIENT-LVL IV: CPT | Mod: PBBFAC,,, | Performed by: PHYSICIAN ASSISTANT

## 2021-12-01 PROCEDURE — 99999 PR PBB SHADOW E&M-EST. PATIENT-LVL IV: ICD-10-PCS | Mod: PBBFAC,,, | Performed by: PHYSICIAN ASSISTANT

## 2021-12-01 PROCEDURE — 99214 PR OFFICE/OUTPT VISIT, EST, LEVL IV, 30-39 MIN: ICD-10-PCS | Mod: S$GLB,,, | Performed by: PHYSICIAN ASSISTANT

## 2021-12-01 RX ORDER — SODIUM CHLORIDE, SODIUM LACTATE, POTASSIUM CHLORIDE, CALCIUM CHLORIDE 600; 310; 30; 20 MG/100ML; MG/100ML; MG/100ML; MG/100ML
INJECTION, SOLUTION INTRAVENOUS CONTINUOUS
Status: CANCELLED | OUTPATIENT
Start: 2021-12-01

## 2021-12-01 RX ORDER — GABAPENTIN 300 MG/1
300 CAPSULE ORAL 2 TIMES DAILY
Qty: 60 CAPSULE | Refills: 2 | Status: ON HOLD | OUTPATIENT
Start: 2021-12-01 | End: 2022-01-19 | Stop reason: HOSPADM

## 2021-12-13 ENCOUNTER — HOSPITAL ENCOUNTER (OUTPATIENT)
Dept: RADIOLOGY | Facility: HOSPITAL | Age: 51
Discharge: HOME | End: 2021-12-13
Attending: ANESTHESIOLOGY
Payer: COMMERCIAL

## 2021-12-13 ENCOUNTER — HOSPITAL ENCOUNTER (OUTPATIENT)
Facility: HOSPITAL | Age: 51
Discharge: HOME | End: 2021-12-13
Attending: ANESTHESIOLOGY | Admitting: ANESTHESIOLOGY
Payer: COMMERCIAL

## 2021-12-13 VITALS
WEIGHT: 115 LBS | TEMPERATURE: 97 F | BODY MASS INDEX: 22.58 KG/M2 | DIASTOLIC BLOOD PRESSURE: 70 MMHG | OXYGEN SATURATION: 99 % | HEIGHT: 60 IN | SYSTOLIC BLOOD PRESSURE: 110 MMHG | RESPIRATION RATE: 16 BRPM | HEART RATE: 78 BPM

## 2021-12-13 DIAGNOSIS — M54.12 CERVICAL RADICULOPATHY: ICD-10-CM

## 2021-12-13 DIAGNOSIS — M50.30 DDD (DEGENERATIVE DISC DISEASE), CERVICAL: ICD-10-CM

## 2021-12-13 LAB
B-HCG UR QL: NEGATIVE
CTP QC/QA: YES

## 2021-12-13 PROCEDURE — 62321 NJX INTERLAMINAR CRV/THRC: CPT | Mod: PO | Performed by: ANESTHESIOLOGY

## 2021-12-13 PROCEDURE — 25500020 PHARM REV CODE 255: Mod: PO | Performed by: ANESTHESIOLOGY

## 2021-12-13 PROCEDURE — 62321 NJX INTERLAMINAR CRV/THRC: CPT | Mod: ,,, | Performed by: ANESTHESIOLOGY

## 2021-12-13 PROCEDURE — 76000 FLUOROSCOPY <1 HR PHYS/QHP: CPT | Mod: TC,PO

## 2021-12-13 PROCEDURE — 81025 URINE PREGNANCY TEST: CPT | Mod: PO | Performed by: ANESTHESIOLOGY

## 2021-12-13 PROCEDURE — 25000003 PHARM REV CODE 250: Mod: PO | Performed by: ANESTHESIOLOGY

## 2021-12-13 PROCEDURE — 62321 PR INJ CERV/THORAC, W/GUIDANCE: ICD-10-PCS | Mod: ,,, | Performed by: ANESTHESIOLOGY

## 2021-12-13 PROCEDURE — 63600175 PHARM REV CODE 636 W HCPCS: Mod: PO | Performed by: ANESTHESIOLOGY

## 2021-12-13 RX ORDER — MIDAZOLAM HYDROCHLORIDE 2 MG/2ML
INJECTION, SOLUTION INTRAMUSCULAR; INTRAVENOUS
Status: DISCONTINUED | OUTPATIENT
Start: 2021-12-13 | End: 2021-12-13 | Stop reason: HOSPADM

## 2021-12-13 RX ORDER — LIDOCAINE HYDROCHLORIDE 10 MG/ML
INJECTION, SOLUTION EPIDURAL; INFILTRATION; INTRACAUDAL; PERINEURAL
Status: DISCONTINUED | OUTPATIENT
Start: 2021-12-13 | End: 2021-12-13 | Stop reason: HOSPADM

## 2021-12-13 RX ORDER — METHYLPREDNISOLONE ACETATE 80 MG/ML
INJECTION, SUSPENSION INTRA-ARTICULAR; INTRALESIONAL; INTRAMUSCULAR; SOFT TISSUE
Status: DISCONTINUED | OUTPATIENT
Start: 2021-12-13 | End: 2021-12-13 | Stop reason: HOSPADM

## 2021-12-13 RX ORDER — SODIUM CHLORIDE, SODIUM LACTATE, POTASSIUM CHLORIDE, CALCIUM CHLORIDE 600; 310; 30; 20 MG/100ML; MG/100ML; MG/100ML; MG/100ML
INJECTION, SOLUTION INTRAVENOUS CONTINUOUS
Status: DISCONTINUED | OUTPATIENT
Start: 2021-12-13 | End: 2021-12-13 | Stop reason: HOSPADM

## 2021-12-13 RX ADMIN — SODIUM CHLORIDE, SODIUM LACTATE, POTASSIUM CHLORIDE, AND CALCIUM CHLORIDE: .6; .31; .03; .02 INJECTION, SOLUTION INTRAVENOUS at 12:12

## 2022-01-11 ENCOUNTER — OFFICE VISIT (OUTPATIENT)
Dept: PAIN MEDICINE | Facility: CLINIC | Age: 52
End: 2022-01-11
Payer: COMMERCIAL

## 2022-01-11 VITALS
DIASTOLIC BLOOD PRESSURE: 56 MMHG | SYSTOLIC BLOOD PRESSURE: 108 MMHG | HEIGHT: 60 IN | BODY MASS INDEX: 24.45 KG/M2 | OXYGEN SATURATION: 98 % | HEART RATE: 84 BPM | WEIGHT: 124.56 LBS

## 2022-01-11 DIAGNOSIS — M54.12 CERVICAL RADICULOPATHY: ICD-10-CM

## 2022-01-11 DIAGNOSIS — Z11.9 ENCOUNTER FOR SCREENING EXAMINATION FOR INFECTIOUS DISEASE: Primary | ICD-10-CM

## 2022-01-11 DIAGNOSIS — M47.812 CERVICAL SPONDYLOSIS: ICD-10-CM

## 2022-01-11 PROCEDURE — 3074F SYST BP LT 130 MM HG: CPT | Mod: CPTII,S$GLB,,

## 2022-01-11 PROCEDURE — 3008F PR BODY MASS INDEX (BMI) DOCUMENTED: ICD-10-PCS | Mod: CPTII,S$GLB,,

## 2022-01-11 PROCEDURE — 99999 PR PBB SHADOW E&M-EST. PATIENT-LVL III: CPT | Mod: PBBFAC,,,

## 2022-01-11 PROCEDURE — 3074F PR MOST RECENT SYSTOLIC BLOOD PRESSURE < 130 MM HG: ICD-10-PCS | Mod: CPTII,S$GLB,,

## 2022-01-11 PROCEDURE — 3078F PR MOST RECENT DIASTOLIC BLOOD PRESSURE < 80 MM HG: ICD-10-PCS | Mod: CPTII,S$GLB,,

## 2022-01-11 PROCEDURE — 99999 PR PBB SHADOW E&M-EST. PATIENT-LVL III: ICD-10-PCS | Mod: PBBFAC,,,

## 2022-01-11 PROCEDURE — 99214 PR OFFICE/OUTPT VISIT, EST, LEVL IV, 30-39 MIN: ICD-10-PCS | Mod: S$GLB,,,

## 2022-01-11 PROCEDURE — 99214 OFFICE O/P EST MOD 30 MIN: CPT | Mod: S$GLB,,,

## 2022-01-11 PROCEDURE — 3008F BODY MASS INDEX DOCD: CPT | Mod: CPTII,S$GLB,,

## 2022-01-11 PROCEDURE — 3078F DIAST BP <80 MM HG: CPT | Mod: CPTII,S$GLB,,

## 2022-01-11 RX ORDER — SODIUM CHLORIDE, SODIUM LACTATE, POTASSIUM CHLORIDE, CALCIUM CHLORIDE 600; 310; 30; 20 MG/100ML; MG/100ML; MG/100ML; MG/100ML
INJECTION, SOLUTION INTRAVENOUS CONTINUOUS
Status: CANCELLED | OUTPATIENT
Start: 2022-01-11

## 2022-01-11 NOTE — PROGRESS NOTES
This note was completed with dictation software and grammatical errors may exist.    CC:  Neck pain, right arm pain    HPI:  Patient is a 51-year-old woman with a history of breast CA status post mastectomy, presents in referral from [facility] Son for neck and right arm pain.  She is status post C7-T1 interlaminar epidural steroid injection to the right on 12/13/21 with 0% relief. She reports she still has about 25% relief from previous MOSHE, but there was no change with this most recent MOSHE. She denies any radicular pain. She reports her current pain is mostly in her neck and trapezius area. She has been able to return to working out, but still has pain with many movements and the pain is still limiting her activities. Denies any new numbness, weakness or changes with bowel or bladder function. She found no relief with the gabapentin and has stopped taking it. She has found only mild relief with Mobic.     Prior HPI:  The patient reports that she was in her normal state of health until about 3 months ago when she was pulling some luggage and felt right shoulder pain.  This seem to worsen and then she began having pain in the right deltoid and right trapezius.  She reports that the pain has radiated further down the arm and has some numbness and tingling in her right hand but also has noted some in her left hand as well.  She states that she gets aching in sensitivity across the base of her neck.  She describes as burning, throbbing, sharp, electric and shooting.  It is worse with standing and walking, extending her neck, doing any lifting.  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  She had seen Jeanna sal and an MRI and cervical spine x-ray was ordered, MRI was reviewed below, x-ray did not show any instability with flexion and extension.  The patient denies any major balance issues but does report over the last year  has noted waking up with numbness and tingling in her hands.  She was previously able to exercise on her own with a  but has not been doing this due to the pain    Pain intervention history:    She is status post C7-T1 interlaminar epidural steroid injection to the right on 10/27/2021 with 75% relief lasting a few weeks, now reporting 25% relief. She is status post C7-T1 interlaminar epidural steroid injection to the right on 21 with 0% relief.     Spine surgeries:  None    Antineuropathics:  Gabapentin 300 mg twice daily  NSAIDs:  Has tried steroid pack without relief, Mobic 7.5  Physical therapy:  Has completed physical therapy with no benefit, was going to [facility] for her shoulder at [facility] but ended up not having much relief  Antidepressants:  Muscle relaxers:  Opioids:  Antiplatelets/Anticoagulants:        ROS:  She reports joint stiffness, joint swelling.  Balance of review of systems is negative.    No results found for: LABA1C, HGBA1C    Lab Results   Component Value Date    WBC 6.72 2019    HGB 13.1 2019    HCT 40.4 2019    MCV 87 2019     2019             Past Medical History:   Diagnosis Date    Arthritis     Breast cancer 2019    Chronic constipation     General anesthetics causing adverse effect in therapeutic use     Respiratory distress     sister    Sciatica        Past Surgical History:   Procedure Laterality Date    BREAST MASS EXCISION Left 2019    Procedure: EXCISION, MASS, BREAST/ 2:00 with Needle loc;  Surgeon: Lucia Lancaster MD;  Location: [facility];  Service: General;  Laterality: Left;    BREAST RECONSTRUCTION Bilateral 8/15/2019    Procedure: RECONSTRUCTION, BREAST - with Immediate Silicone Implants;  Surgeon: Thang White MD;  [facility];  Service: Plastics;  Laterality: Bilateral;     SECTION      EPIDURAL STEROID INJECTION INTO CERVICAL SPINE N/A 10/27/2021    Procedure:  Injection-steroid-epidural-cervical to right;  Surgeon: Chris Fischer MD;  Location: [facility] OR;  Service: Pain Management;  Laterality: N/A;    EPIDURAL STEROID INJECTION INTO CERVICAL SPINE N/A 2021    Procedure: Injection-steroid-epidural-cervical to the right;  Surgeon: Chris Fischer MD;  Location: [facility] OR;  Service: Pain Management;  Laterality: N/A;    SENTINEL LYMPH NODE BIOPSY Left 8/15/2019    Procedure: BIOPSY, LYMPH NODE, SENTINEL;  Surgeon: Lucia Lancaster MD;  Location: [facility];  Service: General;  Laterality: Left;    SIMPLE MASTECTOMY Bilateral 8/15/2019    Procedure: MASTECTOMY, SIMPLE - Nipple Sparing;  Surgeon: Lucia Lancaster MD;  Location: [facility] OR;  Service: General;  Laterality: Bilateral;       Social History     Socioeconomic History    Marital status: Single   Tobacco Use    Smoking status: Former Smoker     Quit date:      Years since quittin.0    Smokeless tobacco: Never Used   Substance and Sexual Activity    Alcohol use: Yes     Comment: socially    Drug use: No         Medications/Allergies: See med card    Vitals:    22 1008   BP: (!) 108/56   Pulse: 84   SpO2: 98%   Weight: 56.5 kg (124 lb 9 oz)   Height: 5' (1.524 m)   PainSc: 0-No pain   PainLoc: Shoulder         Physical exam:  Gen: A and O x3, pleasant, well-groomed  Skin: No rashes or obvious lesions  HEENT: PERRLA, no obvious deformities on ears or in canals.Trachea midline.  CVS: Regular rate and rhythm, normal palpable pulses.  Resp: Clear to auscultation bilaterally, no wheezes or rales.  Abdomen: Soft, NT/ND.  Musculoskeletal: Able to heel walk, toe walk. No antalgic gait.     Neuro:  Upper extremities: 5/5 strength bilaterally   Reflexes: Brachioradialis 3+ right side, 1+ left side, Bicep 3+ right side, 2+ left side, Tricep 3+ bilaterally.   Sensory: Intact and symmetrical to light touch and pinprick in C2-T1 dermatomes bilaterally.    Cervical Spine:  Cervical spine: ROM  is full in flexion, mildly reduced with extension and lateral rotation with increased pain especially on extension.  Spurling's maneuver causes b/l neck and shoulder pain  Myofascial exam: Tenderness to palpation across cervical paraspinous and trapezius region bilaterally.    Imaging:  10/5/21 Cervical MRI   Posterior fossa is within normal limits.  Spinal cord is of normal caliber without signal abnormality.  Slight grade 1 anterolisthesis of C3 on C4 without spondylolysis.  The marrow signal pattern is normal with hemangioma identified in the T1 vertebral body.  No compression deformity, fracture, or subluxation.  Intervertebral disc space narrowing is identified with multilevel disc desiccation.  The predental space and prevertebral soft tissues are normal.   C2-C3: Central disc protrusion without central canal stenosis or neural foraminal narrowing.   C3-C4: Transverse spondylotic ridge without central canal stenosis or neural foraminal narrowing.   C4-C5: Transverse spondylotic ridge eccentric to the right without central canal stenosis.  Uncovertebral facet arthropathy with minimal neural foraminal narrowing on the right.   C5-C6: Transverse spondylotic ridge eccentric to the left effacement of the ventral CSF without central canal stenosis.  Uncovertebral facet arthropathy with moderate left and minimal right neural foraminal narrowing.   C6-C7: Transverse spondylotic ridge eccentric to the left without central canal stenosis.  Uncovertebral facet arthropathy with moderate right and minimal left neural foraminal narrowing.   C7-T1: No significant abnormality.     10/5/21 C-spine Xray:  3 mm anterolisthesis of C3 on C4 which is unchanged with flexion and extension.  5 mm anterolisthesis C4 on C5 which decreases by 1 mm in extension and is unchanged in flexion.  3 mm anterolisthesis of C7 on T1 which is unchanged with flexion and extension.  Vertebral body heights are maintained.  No acute fracture.   Multilevel endplate changes and disc height loss, greatest at C5-6 and C6-7.  Multilevel hypertrophic facet changes.  No prevertebral soft tissue abnormality.  No radiopaque soft tissue foreign body.    Assessment:  Patient is a 51-year-old woman with a history of breast CA status post mastectomy, presents in referral from [facility] Son for neck and right arm pain.    1. Encounter for screening examination for infectious disease  COVID-19 Routine Screening   2. Cervical spondylosis  Case Request Operating Room: Block-nerve-medial branch-cervical C4/5 & C5/^   3. Cervical radiculopathy           Plan:  1. Today her radicular pain has resolved, but she still has some remaining pain in her neck. I believe the remainder of her pain is a combination of myofacial pain and axial facet mediated pain.   2. The pain is limiting mobility and interfering with ADLs  3. Her cervical MRI shows multilevel uncovertebral facet arthropathy with it worse at C4-C6.  I believe her remaining pain is a result of her cervical facet arthropathy.   4. Will schedule her for a diagnostic b/l cervical MBB at C4/5 and C5/6.   5. Follow up after procedures.

## 2022-01-11 NOTE — H&P (VIEW-ONLY)
This note was completed with dictation software and grammatical errors may exist.    CC:  Neck pain, right arm pain    HPI:  Patient is a 51-year-old woman with a history of breast CA status post mastectomy, presents in referral from DIANA Son for neck and right arm pain.  She is status post C7-T1 interlaminar epidural steroid injection to the right on 12/13/21 with 0% relief. She reports she still has about 25% relief from previous MOSHE, but there was no change with this most recent MOSHE. She denies any radicular pain. She reports her current pain is mostly in her neck and trapezius area. She has been able to return to working out, but still has pain with many movements and the pain is still limiting her activities. Denies any new numbness, weakness or changes with bowel or bladder function. She found no relief with the gabapentin and has stopped taking it. She has found only mild relief with Mobic.     Prior HPI:  The patient reports that she was in her normal state of health until about 3 months ago when she was pulling some luggage and felt right shoulder pain.  This seem to worsen and then she began having pain in the right deltoid and right trapezius.  She reports that the pain has radiated further down the arm and has some numbness and tingling in her right hand but also has noted some in her left hand as well.  She states that she gets aching in sensitivity across the base of her neck.  She describes as burning, throbbing, sharp, electric and shooting.  It is worse with standing and walking, extending her neck, doing any lifting.  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  She had seen Jeanna sal and an MRI and cervical spine x-ray was ordered, MRI was reviewed below, x-ray did not show any instability with flexion and extension.  The patient denies any major balance issues but does report over the last year  has noted waking up with numbness and tingling in her hands.  She was previously able to exercise on her own with a  but has not been doing this due to the pain    Pain intervention history:    She is status post C7-T1 interlaminar epidural steroid injection to the right on 10/27/2021 with 75% relief lasting a few weeks, now reporting 25% relief. She is status post C7-T1 interlaminar epidural steroid injection to the right on 21 with 0% relief.     Spine surgeries:  None    Antineuropathics:  Gabapentin 300 mg twice daily  NSAIDs:  Has tried steroid pack without relief, Mobic 7.5  Physical therapy:  Has completed physical therapy with no benefit, was going to [facility] for her shoulder at [facility]st but ended up not having much relief  Antidepressants:  Muscle relaxers:  Opioids:  Antiplatelets/Anticoagulants:        ROS:  She reports joint stiffness, joint swelling.  Balance of review of systems is negative.    No results found for: LABA1C, HGBA1C    Lab Results   Component Value Date    WBC 6.72 2019    HGB 13.1 2019    HCT 40.4 2019    MCV 87 2019     2019             Past Medical History:   Diagnosis Date    Arthritis     Breast cancer 2019    Chronic constipation     General anesthetics causing adverse effect in therapeutic use     Respiratory distress     sister    Sciatica        Past Surgical History:   Procedure Laterality Date    BREAST MASS EXCISION Left 2019    Procedure: EXCISION, MASS, BREAST/ 2:00 with Needle loc;  Surgeon: Lucia Lancaster MD;  Location[facility];  Service: General;  Laterality: Left;    BREAST RECONSTRUCTION Bilateral 8/15/2019    Procedure: RECONSTRUCTION, BREAST - with Immediate Silicone Implants;  Surgeon: Thang White MD;  Location: [facility];  Service: Plastics;  Laterality: Bilateral;     SECTION      EPIDURAL STEROID INJECTION INTO CERVICAL SPINE N/A 10/27/2021    Procedure:  Injection-steroid-epidural-cervical to right;  Surgeon: Chris Fischer MD;  Location: [facility] OR;  Service: Pain Management;  Laterality: N/A;    EPIDURAL STEROID INJECTION INTO CERVICAL SPINE N/A 2021    Procedure: Injection-steroid-epidural-cervical to the right;  Surgeon: Chris Fischer MD;  Location: [facility] OR;  Service: Pain Management;  Laterality: N/A;    SENTINEL LYMPH NODE BIOPSY Left 8/15/2019    Procedure: BIOPSY, LYMPH NODE, SENTINEL;  Surgeon: Lucia Lancaster MD;  Location: [facility] OR;  Service: General;  Laterality: Left;    SIMPLE MASTECTOMY Bilateral 8/15/2019    Procedure: MASTECTOMY, SIMPLE - Nipple Sparing;  Surgeon: Lucia Lancaster MD;  Location: [facility] OR;  Service: General;  Laterality: Bilateral;       Social History     Socioeconomic History    Marital status: Single   Tobacco Use    Smoking status: Former Smoker     Quit date:      Years since quittin.0    Smokeless tobacco: Never Used   Substance and Sexual Activity    Alcohol use: Yes     Comment: socially    Drug use: No         Medications/Allergies: See med card    Vitals:    22 1008   BP: (!) 108/56   Pulse: 84   SpO2: 98%   Weight: 56.5 kg (124 lb 9 oz)   Height: 5' (1.524 m)   PainSc: 0-No pain   PainLoc: Shoulder         Physical exam:  Gen: A and O x3, pleasant, well-groomed  Skin: No rashes or obvious lesions  HEENT: PERRLA, no obvious deformities on ears or in canals.Trachea midline.  CVS: Regular rate and rhythm, normal palpable pulses.  Resp: Clear to auscultation bilaterally, no wheezes or rales.  Abdomen: Soft, NT/ND.  Musculoskeletal: Able to heel walk, toe walk. No antalgic gait.     Neuro:  Upper extremities: 5/5 strength bilaterally   Reflexes: Brachioradialis 3+ right side, 1+ left side, Bicep 3+ right side, 2+ left side, Tricep 3+ bilaterally.   Sensory: Intact and symmetrical to light touch and pinprick in C2-T1 dermatomes bilaterally.    Cervical Spine:  Cervical spine: ROM  is full in flexion, mildly reduced with extension and lateral rotation with increased pain especially on extension.  Spurling's maneuver causes b/l neck and shoulder pain  Myofascial exam: Tenderness to palpation across cervical paraspinous and trapezius region bilaterally.    Imaging:  10/5/21 Cervical MRI   Posterior fossa is within normal limits.  Spinal cord is of normal caliber without signal abnormality.  Slight grade 1 anterolisthesis of C3 on C4 without spondylolysis.  The marrow signal pattern is normal with hemangioma identified in the T1 vertebral body.  No compression deformity, fracture, or subluxation.  Intervertebral disc space narrowing is identified with multilevel disc desiccation.  The predental space and prevertebral soft tissues are normal.   C2-C3: Central disc protrusion without central canal stenosis or neural foraminal narrowing.   C3-C4: Transverse spondylotic ridge without central canal stenosis or neural foraminal narrowing.   C4-C5: Transverse spondylotic ridge eccentric to the right without central canal stenosis.  Uncovertebral facet arthropathy with minimal neural foraminal narrowing on the right.   C5-C6: Transverse spondylotic ridge eccentric to the left effacement of the ventral CSF without central canal stenosis.  Uncovertebral facet arthropathy with moderate left and minimal right neural foraminal narrowing.   C6-C7: Transverse spondylotic ridge eccentric to the left without central canal stenosis.  Uncovertebral facet arthropathy with moderate right and minimal left neural foraminal narrowing.   C7-T1: No significant abnormality.     10/5/21 C-spine Xray:  3 mm anterolisthesis of C3 on C4 which is unchanged with flexion and extension.  5 mm anterolisthesis C4 on C5 which decreases by 1 mm in extension and is unchanged in flexion.  3 mm anterolisthesis of C7 on T1 which is unchanged with flexion and extension.  Vertebral body heights are maintained.  No acute fracture.   Multilevel endplate changes and disc height loss, greatest at C5-6 and C6-7.  Multilevel hypertrophic facet changes.  No prevertebral soft tissue abnormality.  No radiopaque soft tissue foreign body.    Assessment:  Patient is a 51-year-old woman with a history of breast CA status post mastectomy, presents in referral from DIA[facility] Son for neck and right arm pain.    1. Encounter for screening examination for infectious disease  COVID-19 Routine Screening   2. Cervical spondylosis  Case Request Operating Room: Block-nerve-medial branch-cervical C4/5 & C5/^   3. Cervical radiculopathy           Plan:  1. Today her radicular pain has resolved, but she still has some remaining pain in her neck. I believe the remainder of her pain is a combination of myofacial pain and axial facet mediated pain.   2. The pain is limiting mobility and interfering with ADLs  3. Her cervical MRI shows multilevel uncovertebral facet arthropathy with it worse at C4-C6.  I believe her remaining pain is a result of her cervical facet arthropathy.   4. Will schedule her for a diagnostic b/l cervical MBB at C4/5 and C5/6.   5. Follow up after procedures.

## 2022-01-16 ENCOUNTER — LAB VISIT (OUTPATIENT)
Dept: FAMILY MEDICINE | Facility: CLINIC | Age: 52
End: 2022-01-16
Payer: COMMERCIAL

## 2022-01-16 DIAGNOSIS — Z11.9 ENCOUNTER FOR SCREENING EXAMINATION FOR INFECTIOUS DISEASE: ICD-10-CM

## 2022-01-16 PROCEDURE — U0005 INFEC AGEN DETEC AMPLI PROBE: HCPCS

## 2022-01-16 PROCEDURE — U0003 INFECTIOUS AGENT DETECTION BY NUCLEIC ACID (DNA OR RNA); SEVERE ACUTE RESPIRATORY SYNDROME CORONAVIRUS 2 (SARS-COV-2) (CORONAVIRUS DISEASE [COVID-19]), AMPLIFIED PROBE TECHNIQUE, MAKING USE OF HIGH THROUGHPUT TECHNOLOGIES AS DESCRIBED BY CMS-2020-01-R: HCPCS

## 2022-01-17 LAB
SARS-COV-2 RNA RESP QL NAA+PROBE: NOT DETECTED
SARS-COV-2- CYCLE NUMBER: NORMAL

## 2022-01-19 ENCOUNTER — HOSPITAL ENCOUNTER (OUTPATIENT)
Facility: HOSPITAL | Age: 52
Discharge: HOME | End: 2022-01-19
Attending: ANESTHESIOLOGY | Admitting: ANESTHESIOLOGY
Payer: COMMERCIAL

## 2022-01-19 ENCOUNTER — HOSPITAL ENCOUNTER (OUTPATIENT)
Dept: RADIOLOGY | Facility: HOSPITAL | Age: 52
Discharge: HOME | End: 2022-01-19
Attending: ANESTHESIOLOGY
Payer: COMMERCIAL

## 2022-01-19 VITALS
HEART RATE: 84 BPM | SYSTOLIC BLOOD PRESSURE: 104 MMHG | OXYGEN SATURATION: 98 % | BODY MASS INDEX: 24.35 KG/M2 | WEIGHT: 124 LBS | HEIGHT: 60 IN | TEMPERATURE: 97 F | DIASTOLIC BLOOD PRESSURE: 58 MMHG | RESPIRATION RATE: 15 BRPM

## 2022-01-19 DIAGNOSIS — M47.812 CERVICAL SPONDYLOSIS: ICD-10-CM

## 2022-01-19 DIAGNOSIS — M54.2 NECK PAIN: ICD-10-CM

## 2022-01-19 LAB
B-HCG UR QL: NEGATIVE
CTP QC/QA: YES

## 2022-01-19 PROCEDURE — 81025 URINE PREGNANCY TEST: CPT | Mod: PO | Performed by: ANESTHESIOLOGY

## 2022-01-19 PROCEDURE — 64491 INJ PARAVERT F JNT C/T 2 LEV: CPT | Mod: 50,PO | Performed by: ANESTHESIOLOGY

## 2022-01-19 PROCEDURE — 64490 INJ PARAVERT F JNT C/T 1 LEV: CPT | Mod: 50,KX,, | Performed by: ANESTHESIOLOGY

## 2022-01-19 PROCEDURE — 63600175 PHARM REV CODE 636 W HCPCS: Mod: PO | Performed by: ANESTHESIOLOGY

## 2022-01-19 PROCEDURE — 64490 INJ PARAVERT F JNT C/T 1 LEV: CPT | Mod: 50,PO | Performed by: ANESTHESIOLOGY

## 2022-01-19 PROCEDURE — 64491 INJ PARAVERT F JNT C/T 2 LEV: CPT | Mod: 50,KX,, | Performed by: ANESTHESIOLOGY

## 2022-01-19 PROCEDURE — 64490 PR INJ DX/THER AGNT PARAVERT FACET JOINT,IMG GUIDE,CERV/THORAC, 1ST LEVEL: ICD-10-PCS | Mod: 50,KX,, | Performed by: ANESTHESIOLOGY

## 2022-01-19 PROCEDURE — 76000 FLUOROSCOPY <1 HR PHYS/QHP: CPT | Mod: TC,PO

## 2022-01-19 PROCEDURE — 25000003 PHARM REV CODE 250: Mod: PO | Performed by: ANESTHESIOLOGY

## 2022-01-19 PROCEDURE — 64491 PR INJ DX/THER AGNT PARAVERT FACET JOINT,IMG GUIDE,CERV/THORAC, 2ND LEVEL: ICD-10-PCS | Mod: 50,KX,, | Performed by: ANESTHESIOLOGY

## 2022-01-19 PROCEDURE — 25500020 PHARM REV CODE 255: Mod: PO | Performed by: ANESTHESIOLOGY

## 2022-01-19 RX ORDER — MIDAZOLAM HYDROCHLORIDE 2 MG/2ML
INJECTION, SOLUTION INTRAMUSCULAR; INTRAVENOUS
Status: DISCONTINUED | OUTPATIENT
Start: 2022-01-19 | End: 2022-01-19 | Stop reason: HOSPADM

## 2022-01-19 RX ORDER — LIDOCAINE HYDROCHLORIDE 10 MG/ML
INJECTION, SOLUTION EPIDURAL; INFILTRATION; INTRACAUDAL; PERINEURAL
Status: DISCONTINUED | OUTPATIENT
Start: 2022-01-19 | End: 2022-01-19 | Stop reason: HOSPADM

## 2022-01-19 RX ORDER — BUPIVACAINE HYDROCHLORIDE 2.5 MG/ML
INJECTION, SOLUTION EPIDURAL; INFILTRATION; INTRACAUDAL
Status: DISCONTINUED | OUTPATIENT
Start: 2022-01-19 | End: 2022-01-19 | Stop reason: HOSPADM

## 2022-01-19 RX ORDER — SODIUM CHLORIDE, SODIUM LACTATE, POTASSIUM CHLORIDE, CALCIUM CHLORIDE 600; 310; 30; 20 MG/100ML; MG/100ML; MG/100ML; MG/100ML
INJECTION, SOLUTION INTRAVENOUS CONTINUOUS
Status: DISCONTINUED | OUTPATIENT
Start: 2022-01-19 | End: 2022-01-19 | Stop reason: HOSPADM

## 2022-01-19 RX ADMIN — SODIUM CHLORIDE, SODIUM LACTATE, POTASSIUM CHLORIDE, AND CALCIUM CHLORIDE: .6; .31; .03; .02 INJECTION, SOLUTION INTRAVENOUS at 12:01

## 2022-01-19 NOTE — OP NOTE
PROCEDURE DATE: 1/19/2022    PROCEDURE:  Diagnostic Cervical medial branch block of the bilateral C4,5,6 medial branch nerves on the bilateral-side utilizing fluoroscopy    DIAGNOSIS:  Cervical spondylosis    PHYSICIAN: Chris Fischer MD    MEDICATIONS INJECTED:  0.25% bupivicaine, 0.5ml at each level.    LOCAL ANESTHETIC USED:   1% lidocaine, 1ml at each level.    SEDATION MEDICATIONS: 2mg versed    ESTIMATED BLOOD LOSS:  none    COMPLICATIONS:  none    TECHNIQUE : A time-out was taken to identify patient and procedure side prior to starting the procedure.  The patient was positioned prone with the site of interest side up. The patient was prepped and draped in the usual sterile fashion using ChloraPrep and sterile towels.  The level was determined under fluoroscopic guidance using a slightly posteriorly oblique view.   Local anesthetic was infiltrated superficially at the skin level.  Then, a 25 gauge 3.5 inch needle was inserted to the anatomic location of the midsection of the lateral masses of the right and then left C4,5,6. A cross table view was then taken to ensure that needles did not cross into neural foramina. Omnipaque contrast dye was then injected to assess appropriate spread and that there was no vascular uptake. The above noted medication was then injected. The patient tolerated the procedure well.     The patient was monitored after the procedure. The patient will be contacted tomorrow to determine the extent of relief. The patient was given post procedure and discharge instructions to follow at home. The patient was discharged in a stable condition

## 2022-01-19 NOTE — DISCHARGE INSTRUCTIONS
PAIN MANAGEMENT    Home care instructions   Apply ice pack to the injection site for 20 minute prior for the first 24 hours for soreness/discomfort at injection site   DO NOT USE HEAT FOR 24 HOURS   Keep site clean and dry for 24 hours, remove bandaid when desired   Do not drive until tomorrow    BLOCKS  Resume regular activities today  Pain office will call in next 2 days      Resume Aspirin, Plavix, or Coumadin the day after the procedure unless other wise instructed  Resume home medication as prescribed today      CALL PHYSICIAN FOR:   Severe increase in your usual pain or appearance of new pain   Prolonged or increasing weakness or numbness in the legs or arms   Fever greater then 100 degrees F..   Drainage from the incision site, redness, active bleeding or increased swelling at the injection site   Headache that increases when your head is upright and decreases when you lie flat    FOR EMERGENCIES:   Go directly to Emergency Department for Shortness of breath, chest pain, or problems breathing

## 2022-01-19 NOTE — DISCHARGE SUMMARY
[facility] - [facility]  Discharge Note  Short Stay    Procedure(s) (LRB):  Block-nerve-medial branch-cervical C4/5 & C5/6 (Bilateral)    OUTCOME: Patient tolerated treatment/procedure well without complication and is now ready for discharge.    DISPOSITION: Home or Self Care    FINAL DIAGNOSIS:  Cervical spondylosis    FOLLOWUP: In clinic    DISCHARGE INSTRUCTIONS:    Discharge Procedure Orders   Diet Adult Regular     Notify your health care provider if you experience any of the following:  temperature >100.4     No dressing needed     Activity as tolerated

## 2022-01-21 NOTE — INTERVAL H&P NOTE
The patient has been examined and the H&P has been reviewed:    I concur with the findings and no changes have occurred since H&P was written.    Procedure risks, benefits and alternative options discussed and understood by patient/family.      ASA 2, mallampati 2      There are no hospital problems to display for this patient.     

## 2022-01-24 ENCOUNTER — TELEPHONE (OUTPATIENT)
Dept: PAIN MEDICINE | Facility: CLINIC | Age: 52
End: 2022-01-24
Payer: COMMERCIAL

## 2022-01-24 NOTE — TELEPHONE ENCOUNTER
----- Message from Asha Duvall sent at 1/24/2022  1:59 PM CST -----  Regarding: results  Contact: patient  Type: Needs Medical Advice  Who Called:  patient  Symptoms (please be specific):  cervical branch block  Best Call Back Number: [phone] (home)   Additional Information: Patient states she had the procedure on Wednesday 01/19/22 and has not heard from the doctor. She states the procedure did not seem to work. Please call patient. Thanks!

## 2022-01-24 NOTE — TELEPHONE ENCOUNTER
1. What percentage of pain relief did you receive following the block, from 0-100%? What was pain score from 0-10? Patient had no relief. Scheduled for f/u

## 2022-02-01 ENCOUNTER — OFFICE VISIT (OUTPATIENT)
Dept: PAIN MEDICINE | Facility: CLINIC | Age: 52
End: 2022-02-01
Payer: COMMERCIAL

## 2022-02-01 VITALS
HEART RATE: 83 BPM | BODY MASS INDEX: 23.96 KG/M2 | OXYGEN SATURATION: 98 % | RESPIRATION RATE: 18 BRPM | TEMPERATURE: 98 F | DIASTOLIC BLOOD PRESSURE: 52 MMHG | SYSTOLIC BLOOD PRESSURE: 105 MMHG | WEIGHT: 122.69 LBS

## 2022-02-01 DIAGNOSIS — M50.30 DDD (DEGENERATIVE DISC DISEASE), CERVICAL: Primary | ICD-10-CM

## 2022-02-01 DIAGNOSIS — M54.12 CERVICAL RADICULOPATHY: ICD-10-CM

## 2022-02-01 PROCEDURE — 1159F MED LIST DOCD IN RCRD: CPT | Mod: CPTII,S$GLB,, | Performed by: PHYSICIAN ASSISTANT

## 2022-02-01 PROCEDURE — 99213 PR OFFICE/OUTPT VISIT, EST, LEVL III, 20-29 MIN: ICD-10-PCS | Mod: S$GLB,,, | Performed by: PHYSICIAN ASSISTANT

## 2022-02-01 PROCEDURE — 3078F DIAST BP <80 MM HG: CPT | Mod: CPTII,S$GLB,, | Performed by: PHYSICIAN ASSISTANT

## 2022-02-01 PROCEDURE — 1160F PR REVIEW ALL MEDS BY PRESCRIBER/CLIN PHARMACIST DOCUMENTED: ICD-10-PCS | Mod: CPTII,S$GLB,, | Performed by: PHYSICIAN ASSISTANT

## 2022-02-01 PROCEDURE — 3074F PR MOST RECENT SYSTOLIC BLOOD PRESSURE < 130 MM HG: ICD-10-PCS | Mod: CPTII,S$GLB,, | Performed by: PHYSICIAN ASSISTANT

## 2022-02-01 PROCEDURE — 3008F PR BODY MASS INDEX (BMI) DOCUMENTED: ICD-10-PCS | Mod: CPTII,S$GLB,, | Performed by: PHYSICIAN ASSISTANT

## 2022-02-01 PROCEDURE — 99999 PR PBB SHADOW E&M-EST. PATIENT-LVL III: CPT | Mod: PBBFAC,,, | Performed by: PHYSICIAN ASSISTANT

## 2022-02-01 PROCEDURE — 1160F RVW MEDS BY RX/DR IN RCRD: CPT | Mod: CPTII,S$GLB,, | Performed by: PHYSICIAN ASSISTANT

## 2022-02-01 PROCEDURE — 3074F SYST BP LT 130 MM HG: CPT | Mod: CPTII,S$GLB,, | Performed by: PHYSICIAN ASSISTANT

## 2022-02-01 PROCEDURE — 3078F PR MOST RECENT DIASTOLIC BLOOD PRESSURE < 80 MM HG: ICD-10-PCS | Mod: CPTII,S$GLB,, | Performed by: PHYSICIAN ASSISTANT

## 2022-02-01 PROCEDURE — 3008F BODY MASS INDEX DOCD: CPT | Mod: CPTII,S$GLB,, | Performed by: PHYSICIAN ASSISTANT

## 2022-02-01 PROCEDURE — 99213 OFFICE O/P EST LOW 20 MIN: CPT | Mod: S$GLB,,, | Performed by: PHYSICIAN ASSISTANT

## 2022-02-01 PROCEDURE — 1159F PR MEDICATION LIST DOCUMENTED IN MEDICAL RECORD: ICD-10-PCS | Mod: CPTII,S$GLB,, | Performed by: PHYSICIAN ASSISTANT

## 2022-02-01 PROCEDURE — 99999 PR PBB SHADOW E&M-EST. PATIENT-LVL III: ICD-10-PCS | Mod: PBBFAC,,, | Performed by: PHYSICIAN ASSISTANT

## 2022-02-03 NOTE — PROGRESS NOTES
This note was completed with dictation software and grammatical errors may exist.    CC:  Neck pain, right arm pain    HPI:  Patient is a 51-year-old woman with a history of breast CA status post mastectomy, presents in referral from DIANA Son for neck and right arm pain.  She is status post bilateral C4/5, C5/6 diagnostic medial branch nerve blocks on 1/19/2022 with 0% relief.  She continues to have neck pain and trapezius muscle pain.  However, she started physical therapy at the [facility] in [facility] with significant relief of her pain.  She has found that she has some decreased range of motion in her right shoulder but denies weakness or numbness.   She has been exercising regularly but avoids certain activities such as anything overhead that will make her pain worse.    Prior HPI:  The patient reports that she was in her normal state of health until about 3 months ago when she was pulling some luggage and felt right shoulder pain.  This seem to worsen and then she began having pain in the right deltoid and right trapezius.  She reports that the pain has radiated further down the arm and has some numbness and tingling in her right hand but also has noted some in her left hand as well.  She states that she gets aching in sensitivity across the base of her neck.  She describes as burning, throbbing, sharp, electric and shooting.  It is worse with standing and walking, extending her neck, doing any lifting.  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  She had seen Jeanna sal and an MRI and cervical spine x-ray was ordered, MRI was reviewed below, x-ray did not show any instability with flexion and extension.  The patient denies any major balance issues but does report over the last year has noted waking up with numbness and tingling in her hands.  She was previously able to exercise on her own with a   but has not been doing this due to the pain    Pain intervention history:    She is status post C7-T1 interlaminar epidural steroid injection to the right on 10/27/2021 with 75% relief lasting a few weeks, now reporting 25% relief. She is status post C7-T1 interlaminar epidural steroid injection to the right on 21 with 0% relief.   She is status post bilateral C4/5, C5/6 diagnostic medial branch nerve blocks on 2022 with 0% relief.    Spine surgeries:  None    Antineuropathics:  Gabapentin 300 mg twice daily  NSAIDs:  Has tried steroid pack without relief, Mobic 7.5  Physical therapy:  Has completed physical therapy with no benefit, was going to [facility] for her shoulder at 1st but ended up not having much relief  Antidepressants:  Muscle relaxers:  Opioids:  Antiplatelets/Anticoagulants:    ROS:  She reports joint stiffness, joint swelling.  Balance of review of systems is negative.    No results found for: LABA1C, HGBA1C    Lab Results   Component Value Date    WBC 6.72 2019    HGB 13.1 2019    HCT 40.4 2019    MCV 87 2019     2019             Past Medical History:   Diagnosis Date    Arthritis     Breast cancer 2019    Chronic constipation     General anesthetics causing adverse effect in therapeutic use     Respiratory distress     sister    Sciatica        Past Surgical History:   Procedure Laterality Date    BREAST MASS EXCISION Left 2019    Procedure: EXCISION, MASS, BREAST/ 2:00 with Needle loc;  Surgeon: Lucia Lancaster MD;  Location: [facility];  Service: General;  Laterality: Left;    BREAST RECONSTRUCTION Bilateral 8/15/2019    Procedure: RECONSTRUCTION, BREAST - with Immediate Silicone Implants;  Surgeon: Thang White MD;  Location: [facility];  Service: Plastics;  Laterality: Bilateral;     SECTION      EPIDURAL STEROID INJECTION INTO CERVICAL SPINE N/A 10/27/2021    Procedure:  Injection-steroid-epidural-cervical to right;  Surgeon: Chris Fischer MD;  Location: [facility] OR;  Service: Pain Management;  Laterality: N/A;    EPIDURAL STEROID INJECTION INTO CERVICAL SPINE N/A 2021    Procedure: Injection-steroid-epidural-cervical to the right;  Surgeon: Chris Fischer MD;  Location: [facility] OR;  Service: Pain Management;  Laterality: N/A;    INJECTION OF ANESTHETIC AGENT AROUND MEDIAL BRANCH NERVES INNERVATING CERVICAL FACET JOINT Bilateral 2022    Procedure: Block-nerve-medial branch-cervical C4/5 & C5/6;  Surgeon: Chris Fischer MD;  Location: [facility] OR;  Service: Pain Management;  Laterality: Bilateral;    SENTINEL LYMPH NODE BIOPSY Left 8/15/2019    Procedure: BIOPSY, LYMPH NODE, SENTINEL;  Surgeon: Lucia Lancaster MD;  Location: [facility] OR;  Service: General;  Laterality: Left;    SIMPLE MASTECTOMY Bilateral 8/15/2019    Procedure: MASTECTOMY, SIMPLE - Nipple Sparing;  Surgeon: Lucia Lancaster MD;  Location: [facility] OR;  Service: General;  Laterality: Bilateral;       Social History     Socioeconomic History    Marital status: Single   Tobacco Use    Smoking status: Former Smoker     Quit date:      Years since quittin.1    Smokeless tobacco: Never Used   Substance and Sexual Activity    Alcohol use: Yes     Comment: socially    Drug use: No         Medications/Allergies: See med card    Vitals:    22 1444   BP: (!) 105/52   Pulse: 83   Resp: 18   Temp: 97.7 °F (36.5 °C)   TempSrc: Oral   SpO2: 98%   Weight: 55.7 kg (122 lb 11 oz)   PainSc:   2   PainLoc: Neck         Physical exam:  Gen: A and O x3, pleasant, well-groomed  Skin: No rashes or obvious lesions  HEENT: PERRLA, no obvious deformities on ears or in canals.Trachea midline.  CVS: Regular rate and rhythm, normal palpable pulses.  Resp: Clear to auscultation bilaterally, no wheezes or rales.  Abdomen: Soft, NT/ND.  Musculoskeletal: Able to heel walk, toe walk. No antalgic gait.      Neuro:  Upper extremities: 5/5 strength bilaterally   Reflexes: Brachioradialis 3+ right side, 1+ left side, Bicep 3+ right side, 2+ left side, Tricep 3+ bilaterally.   Sensory: Intact and symmetrical to light touch and pinprick in C2-T1 dermatomes bilaterally.    Cervical Spine:  Cervical spine: ROM is full in flexion, mildly reduced with extension and lateral rotation with increased pain especially on extension.  Spurling's maneuver causes b/l neck and shoulder pain  Myofascial exam: Tenderness to palpation across cervical paraspinous and trapezius region bilaterally.    Imaging:  10/5/21 Cervical MRI   Posterior fossa is within normal limits.  Spinal cord is of normal caliber without signal abnormality.  Slight grade 1 anterolisthesis of C3 on C4 without spondylolysis.  The marrow signal pattern is normal with hemangioma identified in the T1 vertebral body.  No compression deformity, fracture, or subluxation.  Intervertebral disc space narrowing is identified with multilevel disc desiccation.  The predental space and prevertebral soft tissues are normal.   C2-C3: Central disc protrusion without central canal stenosis or neural foraminal narrowing.   C3-C4: Transverse spondylotic ridge without central canal stenosis or neural foraminal narrowing.   C4-C5: Transverse spondylotic ridge eccentric to the right without central canal stenosis.  Uncovertebral facet arthropathy with minimal neural foraminal narrowing on the right.   C5-C6: Transverse spondylotic ridge eccentric to the left effacement of the ventral CSF without central canal stenosis.  Uncovertebral facet arthropathy with moderate left and minimal right neural foraminal narrowing.   C6-C7: Transverse spondylotic ridge eccentric to the left without central canal stenosis.  Uncovertebral facet arthropathy with moderate right and minimal left neural foraminal narrowing.   C7-T1: No significant abnormality.     10/5/21 C-spine Xray:  3 mm anterolisthesis  of C3 on C4 which is unchanged with flexion and extension.  5 mm anterolisthesis C4 on C5 which decreases by 1 mm in extension and is unchanged in flexion.  3 mm anterolisthesis of C7 on T1 which is unchanged with flexion and extension.  Vertebral body heights are maintained.  No acute fracture.  Multilevel endplate changes and disc height loss, greatest at C5-6 and C6-7.  Multilevel hypertrophic facet changes.  No prevertebral soft tissue abnormality.  No radiopaque soft tissue foreign body.    Assessment:  Patient is a 51-year-old woman with a history of breast CA status post mastectomy, presents in referral from DIA[facility] Son for neck and right arm pain.    1. DDD (degenerative disc disease), cervical     2. Cervical radiculopathy           Plan:  1. She is having benefit from physical therapy but did not have relief with the diagnostic medial branch nerve block so we will not proceed with radiofrequency ablation.  I encouraged her to continue physical therapy and let me know how she is doing in a few weeks.  If she is having worsening neck pain I will have her follow-up with Dr. Clifton.  If her right shoulder becomes more of a problem, I will have her see Dr. Jang.

## 2022-05-23 PROBLEM — M77.8 CAPSULITIS OF RIGHT SHOULDER: Status: ACTIVE | Noted: 2022-05-23

## 2022-05-23 PROBLEM — M50.30 DDD (DEGENERATIVE DISC DISEASE), CERVICAL: Status: ACTIVE | Noted: 2022-05-23

## 2022-07-05 PROBLEM — S46.101D INJURY OF TENDON OF LONG HEAD OF BICEPS, RIGHT, SUBSEQUENT ENCOUNTER: Status: ACTIVE | Noted: 2022-07-05

## 2022-07-05 PROBLEM — M75.111 NONTRAUMATIC INCOMPLETE TEAR OF RIGHT ROTATOR CUFF: Status: ACTIVE | Noted: 2022-07-05

## 2024-12-03 ENCOUNTER — TELEPHONE (OUTPATIENT)
Dept: INFECTIOUS DISEASES | Facility: CLINIC | Age: 54
End: 2024-12-03
Payer: COMMERCIAL

## 2024-12-03 NOTE — TELEPHONE ENCOUNTER
----- Message from Med Assistant Carver sent at 12/3/2024  3:58 PM CST -----  Contact: Jenny @ [phone]  The patient is calling to speak with staff about getting scheduled for an appointment. Says this is a 2nd opinion for an infection on her spine. No openings coming up at all. Please give her a call back at [phone].

## 2024-12-03 NOTE — TELEPHONE ENCOUNTER
Returned call to patient who states she was recently discharged last nigh from [facility], diagnosed with spinal infection, no hardware, was seen by Dr. Churchill ID, has been seen at [facility] in [place] for Herniated disc at L4 and L4, referred to Pain Management at [facility], did an epidural steroid injection 11/11/24 at [facility] hosp, pain worsened, another injection was scheduled but but she went to ED at [facility] as she could no longer walk, MRI was done and was diagnosed with osteomyelitis and discitis. Dr. Churchill placed on Vanc and another abx, by IV, while in pt, then IV rocephin to home and oral bactrim.     Patient seeing second opinion  Discussed referral process for Dr. Brannon   Patient voiced she may seek medical attention at ED for eval as she is fearful for condition

## 2024-12-05 ENCOUNTER — TELEPHONE (OUTPATIENT)
Dept: INFECTIOUS DISEASES | Facility: CLINIC | Age: 54
End: 2024-12-05
Payer: COMMERCIAL

## 2024-12-05 PROBLEM — M46.26 OSTEOMYELITIS OF LUMBAR VERTEBRA: Status: ACTIVE | Noted: 2024-12-05

## 2024-12-05 PROBLEM — M77.8 CAPSULITIS OF RIGHT SHOULDER: Status: RESOLVED | Noted: 2022-05-23 | Resolved: 2024-12-05

## 2024-12-05 PROBLEM — S46.101D INJURY OF TENDON OF LONG HEAD OF BICEPS, RIGHT, SUBSEQUENT ENCOUNTER: Status: RESOLVED | Noted: 2022-07-05 | Resolved: 2024-12-05

## 2024-12-05 PROBLEM — M47.812 CERVICAL SPONDYLOSIS: Status: RESOLVED | Noted: 2022-01-19 | Resolved: 2024-12-05

## 2024-12-05 PROBLEM — M54.12 CERVICAL RADICULOPATHY: Status: RESOLVED | Noted: 2021-10-27 | Resolved: 2024-12-05

## 2024-12-05 PROBLEM — K68.12 PSOAS MUSCLE ABSCESS: Status: ACTIVE | Noted: 2024-12-05

## 2024-12-05 PROBLEM — Z80.3 FAMILY HISTORY OF BREAST CANCER: Status: RESOLVED | Noted: 2020-09-25 | Resolved: 2024-12-05

## 2024-12-05 PROBLEM — M75.111 NONTRAUMATIC INCOMPLETE TEAR OF RIGHT ROTATOR CUFF: Status: RESOLVED | Noted: 2022-07-05 | Resolved: 2024-12-05

## 2024-12-05 PROBLEM — R26.2 DIFFICULTY WALKING: Status: ACTIVE | Noted: 2024-12-05

## 2024-12-05 PROBLEM — M46.46 DISCITIS OF LUMBAR REGION: Status: ACTIVE | Noted: 2024-12-05

## 2024-12-05 PROBLEM — M54.50 CHRONIC LOW BACK PAIN: Status: ACTIVE | Noted: 2024-12-05

## 2024-12-05 PROBLEM — G89.29 CHRONIC LOW BACK PAIN: Status: ACTIVE | Noted: 2024-12-05

## 2024-12-05 NOTE — TELEPHONE ENCOUNTER
----- Message from Viralizea sent at 12/5/2024 11:15 AM CST -----  Regarding: Appt  Contact: [phone]  Who call ?  PT Geovanna Wyatt     What is the request Details : Edmundo  calling to speak with someone in provider office regards scheduling an appt for bone infection . States pt losing a lot of weight and in pain cannot not stand up  would like to be scheduled soon . Stated if pt has to go to ER please let him no. No appt available.   Please call  back.       Can clinic  use patient portal  : No     What number to call back : [phone]

## 2024-12-05 NOTE — TELEPHONE ENCOUNTER
Called patient back,   She is current on IV abx therapy ordered by Dr. Churchill.   Unable to schedule appointment with [facility], advice patient to follow up Dr. Churchill or to go to the ED if she needs an urgent appointment.

## 2024-12-06 PROBLEM — D64.9 ANEMIA: Status: ACTIVE | Noted: 2024-12-06

## 2024-12-06 PROBLEM — Z73.6 LIMITATION OF ACTIVITY DUE TO DISABILITY: Status: ACTIVE | Noted: 2024-12-06

## 2024-12-09 ENCOUNTER — TELEPHONE (OUTPATIENT)
Dept: NEUROSURGERY | Facility: CLINIC | Age: 54
End: 2024-12-09
Payer: COMMERCIAL

## 2024-12-09 DIAGNOSIS — M86.10 OTHER ACUTE OSTEOMYELITIS, UNSPECIFIED SITE: Primary | ICD-10-CM

## 2024-12-09 NOTE — TELEPHONE ENCOUNTER
----- Message from Tracy Dominguez NP sent at 12/6/2024  2:37 PM CST -----  Follow up in 4 weeks with repeat MRI lumbar spine for osteomyelitis follow up

## 2024-12-16 ENCOUNTER — PATIENT OUTREACH (OUTPATIENT)
Dept: ADMINISTRATIVE | Facility: CLINIC | Age: 54
End: 2024-12-16
Payer: COMMERCIAL

## 2024-12-16 ENCOUNTER — TELEPHONE (OUTPATIENT)
Dept: NEUROSURGERY | Facility: CLINIC | Age: 54
End: 2024-12-16
Payer: COMMERCIAL

## 2024-12-16 NOTE — PROGRESS NOTES
C3 nurse spoke with Jenny Baugh for a TCC post hospital discharge follow up call. The patient has a scheduled HOSFU appointment with Jojo Gunter on 12/12/24.

## 2024-12-16 NOTE — TELEPHONE ENCOUNTER
Called patient to schedule an upcoming appointment with Luke Mcdonald MD and provided next appointment date and time.  Future Appointments   Date Time Provider Department Center   12/18/2024  1:00 PM Velma Guadalupe NP [facility] NEUROS8 [facility]   1/3/2025  9:00 AM [facility] MRI[address] [place] [address]   1/3/2025 10:30 AM [facility]-C [facility]   1/8/2025  2:30 PM Ketan Brannon MD [facility] INFECT [place]        Patient voiced understanding and thanked me.

## 2024-12-18 ENCOUNTER — OFFICE VISIT (OUTPATIENT)
Dept: NEUROSURGERY | Facility: CLINIC | Age: 54
End: 2024-12-18
Payer: COMMERCIAL

## 2024-12-18 DIAGNOSIS — M48.07 SPINAL STENOSIS, LUMBOSACRAL REGION: ICD-10-CM

## 2024-12-18 DIAGNOSIS — M46.46 DISCITIS OF LUMBAR REGION: ICD-10-CM

## 2024-12-18 DIAGNOSIS — M86.10 OTHER ACUTE OSTEOMYELITIS, UNSPECIFIED SITE: Primary | ICD-10-CM

## 2024-12-18 PROCEDURE — 1111F DSCHRG MED/CURRENT MED MERGE: CPT | Mod: CPTII,S$GLB,, | Performed by: NURSE PRACTITIONER

## 2024-12-18 PROCEDURE — 1160F RVW MEDS BY RX/DR IN RCRD: CPT | Mod: CPTII,S$GLB,, | Performed by: NURSE PRACTITIONER

## 2024-12-18 PROCEDURE — 99213 OFFICE O/P EST LOW 20 MIN: CPT | Mod: S$GLB,,, | Performed by: NURSE PRACTITIONER

## 2024-12-18 PROCEDURE — 99999 PR PBB SHADOW E&M-EST. PATIENT-LVL III: CPT | Mod: PBBFAC,,, | Performed by: NURSE PRACTITIONER

## 2024-12-18 PROCEDURE — 1159F MED LIST DOCD IN RCRD: CPT | Mod: CPTII,S$GLB,, | Performed by: NURSE PRACTITIONER

## 2024-12-18 NOTE — PROGRESS NOTES
CHIEF COMPLAINT:  Burning leg pain    I, Julia Mattson, attest that this documentation has been prepared under the direction and in the presence of Luke Mcdonald MD.    HPI from 12/5/2024 (Tracy Dominguez NP):   54-year-old female presents for evaluation of worsening back pain. The patient was admitted to [facility] with diskitis/osteomyelitis and psoas abscess. Her blood cultures grew out E coli. She was discharged home on Rocephin IV and Bactrim p.o.. She was discharged 3 days ago. She reports increased back pain since discharge. She denies any fever. She reports difficulty walking secondary to the pain. She denies any bowel or bladder dysfunction. She denies any numbness or loss of motor function.     Interval Hx:   Today the patient reports that her original hip pain and weakness has improved slowly with the antibiotics. She is now experiencing a new burning pain in her legs. She is currently taking 300 mg of gabapentin 3x a day. She is also still participating in PT. Her next visit with her infectious disease providers is on January 8th. Denies fever or chills.     Patient denies any other complaints at this time.    Review of patient's allergies indicates:   Allergen Reactions    Chlorhexidine Rash       Past Medical History:   Diagnosis Date    Arthritis     Breast cancer 06/07/2019    Cervical radiculopathy     Cervical spine disease     Chronic constipation     General anesthetics causing adverse effect in therapeutic use     Respiratory distress     sister    Sciatica      Past Surgical History:   Procedure Laterality Date    BREAST MASS EXCISION Left 6/7/2019    Procedure: EXCISION, MASS, BREAST/ 2:00 with Needle loc;  Surgeon: Lucia Lancaster MD;  Location: [facility];  Service: General;  Laterality: Left;    BREAST RECONSTRUCTION Bilateral 8/15/2019    Procedure: RECONSTRUCTION, BREAST - with Immediate Silicone Implants;  Surgeon: Thang White MD;  Location: [facility] OR;   Service: Plastics;  Laterality: Bilateral;     SECTION      EPIDURAL STEROID INJECTION INTO CERVICAL SPINE N/A 10/27/2021    Procedure: Injection-steroid-epidural-cervical to right;  Surgeon: Chris Fischer MD;  Location: [facility] OR;  Service: Pain Management;  Laterality: N/A;    EPIDURAL STEROID INJECTION INTO CERVICAL SPINE N/A 2021    Procedure: Injection-steroid-epidural-cervical to the right;  Surgeon: Chris Fischer MD;  Location: [facility] OR;  Service: Pain Management;  Laterality: N/A;    INJECTION OF ANESTHETIC AGENT AROUND MEDIAL BRANCH NERVES INNERVATING CERVICAL FACET JOINT Bilateral 2022    Procedure: Block-nerve-medial branch-cervical C4/5 & C5/6;  Surgeon: Chris Fischer MD;  Location: [facility] OR;  Service: Pain Management;  Laterality: Bilateral;    SENTINEL LYMPH NODE BIOPSY Left 8/15/2019    Procedure: BIOPSY, LYMPH NODE, SENTINEL;  Surgeon: Lucia Lancaster MD;  Location: [facility] OR;  Service: General;  Laterality: Left;    SIMPLE MASTECTOMY Bilateral 8/15/2019    Procedure: MASTECTOMY, SIMPLE - Nipple Sparing;  Surgeon: Lucia Lancaster MD;  Location: [facility] OR;  Service: General;  Laterality: Bilateral;     Family History   Problem Relation Name Age of Onset    Breast cancer Sister  48    Diabetes Mother      Heart disease Mother      Emphysema Father      Diabetes Father       Social History     Tobacco Use    Smoking status: Former     Current packs/day: 0.00     Types: Cigarettes     Quit date:      Years since quittin.9    Smokeless tobacco: Never   Substance Use Topics    Alcohol use: Yes     Comment: socially    Drug use: No        Review of Systems   Musculoskeletal:         Leg pain.   All other systems reviewed and are negative.      OBJECTIVE:   Vital Signs:       Physical Exam:    General: well developed, well nourished, no distress.   Head: normocephalic, atraumatic  Neurologic: Alert and oriented. Thought content appropriate.  GCS: Motor:  6/Verbal: 5/Eyes: 4 GCS Total: 15  Mental Status: Awake, Alert, Oriented x 4  Language: No aphasia  Speech: No dysarthria  Cranial nerves: face symmetric, tongue midline, CN II-XII grossly intact.   Eyes: pupils equal, round, reactive to light with accomodation, EOMI.   Pulmonary: normal respirations, no signs of respiratory distress  Abdomen: soft, non-distended  Skin: Skin is warm, dry and intact.  Sensory: intact to light touch throughout  Motor Strength:Moves all extremities spontaneously with good tone.       ASSESSMENT/PLAN:     Jenny Baugh with L4-5 diskitis/osteomyelitis and psoas abscess. The patient is improving slowly with antibiotic treatment without new neurological deficits. Dr. Mcdonald discussed with the patient that a CT lumbar spine is recommended and dynamic x-ray. She was encouraged to follow-up with ID. I would like the patient to follow-up in 3 months for continued surveillance. I have encouraged her to contact the clinic with any questions, concerns, or adverse clinical changes. She verbalized understanding.      MANDY Shelton-SOFÍA  Neurosurgery  [facility]. [place].

## 2024-12-21 ENCOUNTER — ON-DEMAND VIRTUAL (OUTPATIENT)
Dept: URGENT CARE | Facility: CLINIC | Age: 54
End: 2024-12-21
Payer: COMMERCIAL

## 2024-12-21 ENCOUNTER — NURSE TRIAGE (OUTPATIENT)
Dept: ADMINISTRATIVE | Facility: CLINIC | Age: 54
End: 2024-12-21
Payer: COMMERCIAL

## 2024-12-21 DIAGNOSIS — R50.9 FEVER, UNSPECIFIED FEVER CAUSE: ICD-10-CM

## 2024-12-21 DIAGNOSIS — L28.2 PRURITIC RASH: Primary | ICD-10-CM

## 2024-12-21 PROCEDURE — 99213 OFFICE O/P EST LOW 20 MIN: CPT | Mod: 95,,, | Performed by: NURSE PRACTITIONER

## 2024-12-21 RX ORDER — PREDNISONE 20 MG/1
20 TABLET ORAL DAILY
Qty: 3 TABLET | Refills: 0 | Status: SHIPPED | OUTPATIENT
Start: 2024-12-21 | End: 2024-12-24

## 2024-12-21 NOTE — PROGRESS NOTES
Subjective:      Patient ID: Jenny Baugh is a 54 y.o. female.    Vitals:  vitals were not taken for this visit.     Chief Complaint: Rash and Fever      Visit Type: TELE AUDIOVISUAL    Present with the patient at the time of consultation: TELEMED PRESENT WITH PATIENT: Home health nurse, aniya Harding     Past Medical History:   Diagnosis Date    Arthritis     Breast cancer 2019    Cervical radiculopathy     Cervical spine disease     Chronic constipation     General anesthetics causing adverse effect in therapeutic use     Respiratory distress     sister    Sciatica      Past Surgical History:   Procedure Laterality Date    BREAST MASS EXCISION Left 2019    Procedure: EXCISION, MASS, BREAST/ 2:00 with Needle loc;  Surgeon: Lucia Lancaster MD;  Location: [facility];  Service: General;  Laterality: Left;    BREAST RECONSTRUCTION Bilateral 8/15/2019    Procedure: RECONSTRUCTION, BREAST - with Immediate Silicone Implants;  Surgeon: Thang White MD;  Location: [facility];  Service: Plastics;  Laterality: Bilateral;     SECTION      EPIDURAL STEROID INJECTION INTO CERVICAL SPINE N/A 10/27/2021    Procedure: Injection-steroid-epidural-cervical to right;  Surgeon: Chris Fischer MD;  Location: [facility];  Service: Pain Management;  Laterality: N/A;    EPIDURAL STEROID INJECTION INTO CERVICAL SPINE N/A 2021    Procedure: Injection-steroid-epidural-cervical to the right;  Surgeon: Chris Fischer MD;  Location: [facility];  Service: Pain Management;  Laterality: N/A;    INJECTION OF ANESTHETIC AGENT AROUND MEDIAL BRANCH NERVES INNERVATING CERVICAL FACET JOINT Bilateral 2022    Procedure: Block-nerve-medial branch-cervical C4/5 & C5/6;  Surgeon: Chris Fischer MD;  Location: [facility];  Service: Pain Management;  Laterality: Bilateral;    SENTINEL LYMPH NODE BIOPSY Left 8/15/2019    Procedure: BIOPSY, LYMPH NODE, SENTINEL;  Surgeon: Lucia Lancaster MD;  Location:  [facility];  Service: General;  Laterality: Left;    SIMPLE MASTECTOMY Bilateral 8/15/2019    Procedure: MASTECTOMY, SIMPLE - Nipple Sparing;  Surgeon: Lucia Lancaster MD;  Location: [facility];  Service: General;  Laterality: Bilateral;     Review of patient's allergies indicates:   Allergen Reactions    Chlorhexidine Rash     Current Outpatient Medications on File Prior to Visit   Medication Sig Dispense Refill    cefTRIAXone (ROCEPHIN) 1 gram/50 mL IVPB Inject 100 mLs (2 g total) into the vein every 12 (twelve) hours. PICC LINE      docusate sodium (COLACE) 100 MG capsule Take 100 mg by mouth 2 (two) times daily.      gabapentin (NEURONTIN) 300 MG capsule Take 300 mg by mouth 3 (three) times daily.      oxyCODONE-acetaminophen (PERCOCET)  mg per tablet Take 1 tablet by mouth 4 (four) times daily as needed for Pain.      polyethylene glycol (GLYCOLAX) 17 gram PwPk Take 17 g by mouth once daily.      progesterone (PROMETRIUM) 200 MG capsule Take 200 mg by mouth every evening.      SYNTHROID 75 mcg tablet Take 75 mcg by mouth before breakfast.      vitamin D (VITAMIN D3) 1000 units Tab Take 1,000 Units by mouth once daily.       Current Facility-Administered Medications on File Prior to Visit   Medication Dose Route Frequency Provider Last Rate Last Admin    lactated ringers infusion   Intravenous Continuous Piero Chin MD 0 mL/hr at 06/07/19 1350 New Bag at 08/15/19 0645     Family History   Problem Relation Name Age of Onset    Breast cancer Sister  48    Diabetes Mother      Heart disease Mother      Emphysema Father      Diabetes Father         Medications Ordered                [facility]  OZIEL Harding  [address]   [address] [address]    Telephone: [phone]   Fax: [phone]   Hours: Not open 24 hours                         E-Prescribed (1 of 1)              predniSONE (DELTASONE) 20 MG tablet    Sig: Take 1 tablet (20 mg total) by mouth once daily. for 3 days        Start: 12/21/24     Quantity: 3 tablet Refills: 0                           Ohs Peq Odvv Intake    12/21/2024 10:05 AM CST - Filed by Patient   What is your current physical address in the event of a medical emergency? [address]   Are you able to take your vital signs? Yes   Systolic Blood Pressure: 106   Diastolic Blood Pressure: 63   Weight: 93   Height: 5   Pulse: 62   Temperature: 100.2   Respiration rate: 17   Pulse Oxygen: 98   Please attach any relevant images or files    Is your employer contracted with [facility]? No         Pt presents with c/o low grade fever x1 day, this morning temp 102, home health nurse is with her, gave tylenol just now. She reports also noted itching rash from head to toe all over body, recalls started a new B6 supplement about 24 hours ago. Denies any new foods, detergents, or drinks, perfumes. Reports only went out her home a few days ago to Doctors office. She denies sore throat, body aches, SOB, CP, nvd. Nurse will give her benadryl until she can  the prescription.          Constitution: Positive for fatigue and fever.   HENT:  Negative for sore throat.    Cardiovascular:  Negative for chest pain.   Respiratory:  Negative for cough and shortness of breath.    Gastrointestinal:  Negative for abdominal pain.   Skin:  Positive for rash and erythema.        Itching all over   Neurological:  Negative for headaches.        Objective:   The physical exam was conducted virtually.  Physical Exam   Constitutional: She is oriented to person, place, and time. No distress.   HENT:   Head: Normocephalic.   Ears:   Right Ear: External ear normal.   Left Ear: External ear normal.   Eyes: Extraocular movement intact   Neck: Neck supple.   Pulmonary/Chest: Effort normal. No respiratory distress.   Neurological: She is alert and oriented to person, place, and time.   Skin: Skin is rash. erythema   Psychiatric: Mood normal.       Assessment:     1. Pruritic rash    2. Fever,  unspecified fever cause        Plan:   Increase fluids and rest  Take medication as directed  Try to stop the new supplement to see if no more rash  If increased Shortness of breath or difficulty breathing go to the Urgent Care or ER    Fever:   Take tylenol every 6 hours as directed for fever  May need to get a home Covid/Flu test kit for testing   Increase fluids and rest  If worsening symptoms will need to go into the Urgent Care for inperson visit and testing    Pruritic rash  -     predniSONE (DELTASONE) 20 MG tablet; Take 1 tablet (20 mg total) by mouth once daily. for 3 days  Dispense: 3 tablet; Refill: 0    Fever, unspecified fever cause      We appreciate you trusting us with your medical care. We hope you feel better soon. We will be happy to take care of you for all of your future medical needs.     You must understand that you've received Virtual treatment only and that you may be released before all your medical problems are known or treated. You, the patient, will arrange for follow up care as instructed.     Follow up with your PCP or specialty clinic as directed in the next 1-2 days if not improved or as needed. You can call [phone] to schedule an appointment with the appropriate provider.     If your condition worsens we recommend that you receive another evaluation in person, with your primary care provider, urgent care or at the emergency room immediately or contact your primary medical clinics after hours call service to discuss your concerns.

## 2024-12-21 NOTE — TELEPHONE ENCOUNTER
Patient c/o a rash that developed this morning. She also has medication compatibility questions. Per protocol patient was advised to be seen within 24 hours. Patient verbalizes understanding.  Advised the patient to call back with any further questions or if symptoms worsen.             Reason for Disposition   Hives or itching    Additional Information   Negative: [1] Life-threatening reaction (anaphylaxis) in the past to the same drug AND [2] < 2 hours since exposure   Negative: Difficulty breathing or wheezing   Negative: [1] Hoarseness or cough AND [2] started soon after 1st dose of drug   Negative: [1] Swollen tongue AND [2] started soon after 1st dose of drug   Negative: [1] Purple or blood-colored rash (spots or dots) AND [2] fever   Negative: Sounds like a life-threatening emergency to the triager   Negative: Swollen tongue   Negative: [1] Widespread hives AND [2] onset < 2 hours of exposure to 1st dose of drug   Negative: Fever   Negative: Patient sounds very sick or weak to the triager   Negative: [1] Purple or blood-colored rash (spots or dots) AND [2] no fever AND [3] sounds well to triager   Negative: [1] Taking new prescription medication AND [2] rash within 4 hours of 1st dose   Negative: Large or small blisters on skin (i.e., fluid filled bubbles or sacs)   Negative: Bloody crusts on lips or sores in mouth   Negative: Face or lip swelling    Protocols used: Rash - Widespread On Drugs-A-

## 2024-12-21 NOTE — PATIENT INSTRUCTIONS
Increase fluids and rest  Take medication as directed  Try to stop the new supplement to see if no more rash  If increased Shortness of breath or difficulty breathing go to the Urgent Care or ER    Fever:   Take tylenol every 6 hours as directed for fever  May need to get a home Covid/Flu test kit for testing   Increase fluids and rest  If worsening symptoms will need to go into the Urgent Care for inperson visit and testing

## 2024-12-23 ENCOUNTER — TELEPHONE (OUTPATIENT)
Dept: RHEUMATOLOGY | Facility: CLINIC | Age: 54
End: 2024-12-23
Payer: COMMERCIAL

## 2024-12-23 NOTE — TELEPHONE ENCOUNTER
----- Message from Chiara sent at 12/23/2024 10:10 AM CST -----  Contact: [facility]  Type: Needs Medical Advice  Who Called:  [facility]  Symptoms (please be specific):  pt tiffany daly has a lump on the side  Best Call Back Number: [phone]  Additional Information: please call

## 2024-12-23 NOTE — TELEPHONE ENCOUNTER
----- Message from Anaya sent at 12/23/2024 12:08 PM CST -----  Type:  Needs Medical Advice    Who Called: Ny - [facility]      Symptoms (please be specific): na     How long has patient had these symptoms:  dorothy    Pharmacy name and phone #:    [id number] - [place], [place] - [address]  [address] [address]  Phone: [phone] Fax: [phone]      Would the patient rather a call back or a response via [facility]? Call back    Best Call Back Number: Ny - [phone]    Additional Information: Ny is calling  to speak with someone in the office to make Dr Yadav aware of a rash that the pt is experiencing and thinking it may be from the antibiotic     Please call Back to advise. Thanks!

## 2024-12-23 NOTE — TELEPHONE ENCOUNTER
Spoke to Catalina at [facility] ER. PT is going to proceed and go to the ER. She has been having a rash and running fever on and off again. HH nurse came to evaluate PICC line this morning and they notice a lump next to where the PICC line is inserted. Advised to go to ER to be evaluated. Pt verbalized understanding.Informed her home health nurse as well.

## 2024-12-26 ENCOUNTER — TELEPHONE (OUTPATIENT)
Dept: INFECTIOUS DISEASES | Facility: CLINIC | Age: 54
End: 2024-12-26
Payer: COMMERCIAL

## 2024-12-26 NOTE — TELEPHONE ENCOUNTER
----- Message from Alecia sent at 12/26/2024  4:03 PM CST -----  Contact: Joan ernandez/[facility]  This infusion company has been giving the pt iv antibiotics she administers at home and she is getting itchiness and rashes right after she administers the meds and she has started running a low grade fever, it started this past Sunday 12/22/24.  She did go to ER to have it checked out and everything was fine with her line and it always happens almost immediately after she administers her antibiotics.   Call Ny back at [phone] to advise and thanks

## 2024-12-26 NOTE — TELEPHONE ENCOUNTER
Spoke to Ny at the infusion center. Pt has symptoms of itching after administering Rocephin IV via her PICC line, every time. She does take benadryl 30 min prior to administering medications. Pt wanted to you to be aware in case you wanted to change medication. Pt has f/u visit with you on 1/8/25.

## 2024-12-26 NOTE — TELEPHONE ENCOUNTER
Spoke to pt and she has some itching for 1 hour after the infusion.She is going to try and continue the medicaition for now. She noticed this happening with the new batch of medication from the pharmacy, she only has 3 more doses from this batch. She will take benadry 30 min prior to administering the medication. She did run a fever today but it went away after she took tylenol. She will monitor her temps as well and keep us updated.

## 2024-12-27 ENCOUNTER — NURSE TRIAGE (OUTPATIENT)
Dept: ADMINISTRATIVE | Facility: CLINIC | Age: 54
End: 2024-12-27
Payer: COMMERCIAL

## 2024-12-27 NOTE — TELEPHONE ENCOUNTER
Spoke to pt and advised to go to the ED. Pt verbalized understanding and is on her way to the hosptial. Dr. Brannon informed.

## 2024-12-27 NOTE — TELEPHONE ENCOUNTER
Pt has a PICC line. She has been having some allergic reactions with it lately. Ever since she got her injection this morning, the right side of her chest feels heavy as well as back and when she lifts her right arm she is having pain as well. Denies SOB, but just feels a heavy feeling in her chest. Started this morning right after her rocephin injection through the picc line. For the last week, pt was getting a rash and itching but that had slowed down. Spoke with nurse yesterday and informed Dr. Yadav wanted her to continue the rocephin if possible. Pt has osteomyelitis. Seen in ED on 12/23 for the itching/rash and for eval of PICC line because of pain to the lateral side of the insertion site.    Dispo- go to ED now. Caller advised and VU. Callback reasons post ED visit discussed.  Reason for Disposition   Chest pain and has central line (e.g., Broviac, Dow, Port) or PICC line    Additional Information   Negative: SEVERE difficulty breathing (e.g., struggling for each breath, speaks in single words)   Negative: Shock suspected (e.g., cold/pale/clammy skin, too weak to stand, low BP, rapid pulse)   Negative: Cracked or broken central line (e.g., Broviac, Dow, Port) or PICC line   Negative: Sounds like a life-threatening emergency to the triager    Protocols used: IV Site and Other Symptoms-A-OH

## 2024-12-31 ENCOUNTER — HOSPITAL ENCOUNTER (OUTPATIENT)
Dept: RADIOLOGY | Facility: HOSPITAL | Age: 54
Discharge: HOME | End: 2024-12-31
Attending: NURSE PRACTITIONER
Payer: COMMERCIAL

## 2024-12-31 DIAGNOSIS — M48.07 SPINAL STENOSIS, LUMBOSACRAL REGION: ICD-10-CM

## 2024-12-31 DIAGNOSIS — M86.10 OTHER ACUTE OSTEOMYELITIS, UNSPECIFIED SITE: ICD-10-CM

## 2024-12-31 PROCEDURE — 72114 X-RAY EXAM L-S SPINE BENDING: CPT | Mod: TC,FY,PO

## 2024-12-31 PROCEDURE — 72131 CT LUMBAR SPINE W/O DYE: CPT | Mod: 26,,, | Performed by: RADIOLOGY

## 2024-12-31 PROCEDURE — 72114 X-RAY EXAM L-S SPINE BENDING: CPT | Mod: 26,,, | Performed by: RADIOLOGY

## 2024-12-31 PROCEDURE — 72131 CT LUMBAR SPINE W/O DYE: CPT | Mod: TC,PO

## 2025-01-03 ENCOUNTER — OFFICE VISIT (OUTPATIENT)
Dept: NEUROSURGERY | Facility: CLINIC | Age: 55
End: 2025-01-03
Payer: COMMERCIAL

## 2025-01-03 ENCOUNTER — HOSPITAL ENCOUNTER (OUTPATIENT)
Dept: RADIOLOGY | Facility: HOSPITAL | Age: 55
Discharge: HOME | End: 2025-01-03
Attending: PHYSICIAN ASSISTANT
Payer: COMMERCIAL

## 2025-01-03 VITALS
DIASTOLIC BLOOD PRESSURE: 61 MMHG | HEART RATE: 86 BPM | RESPIRATION RATE: 18 BRPM | WEIGHT: 91.06 LBS | HEIGHT: 60 IN | SYSTOLIC BLOOD PRESSURE: 113 MMHG | BODY MASS INDEX: 17.88 KG/M2

## 2025-01-03 DIAGNOSIS — M48.07 SPINAL STENOSIS, LUMBOSACRAL REGION: ICD-10-CM

## 2025-01-03 DIAGNOSIS — M46.46 DISCITIS OF LUMBAR REGION: Primary | ICD-10-CM

## 2025-01-03 DIAGNOSIS — M86.10 OTHER ACUTE OSTEOMYELITIS, UNSPECIFIED SITE: ICD-10-CM

## 2025-01-03 PROCEDURE — 72158 MRI LUMBAR SPINE W/O & W/DYE: CPT | Mod: 26,,, | Performed by: RADIOLOGY

## 2025-01-03 PROCEDURE — 25500020 PHARM REV CODE 255: Mod: PO | Performed by: PHYSICIAN ASSISTANT

## 2025-01-03 PROCEDURE — 72158 MRI LUMBAR SPINE W/O & W/DYE: CPT | Mod: TC,PO

## 2025-01-03 PROCEDURE — A9585 GADOBUTROL INJECTION: HCPCS | Mod: PO | Performed by: PHYSICIAN ASSISTANT

## 2025-01-03 RX ORDER — GADOBUTROL 604.72 MG/ML
4 INJECTION INTRAVENOUS
Status: COMPLETED | OUTPATIENT
Start: 2025-01-03 | End: 2025-01-03

## 2025-01-03 RX ADMIN — GADOBUTROL 4 ML: 604.72 INJECTION INTRAVENOUS at 09:01

## 2025-01-03 NOTE — PROGRESS NOTES
Neurosurgery History and Physical    Patient ID: Jenny Baugh is a 54 y.o. female.    Chief Complaint   Patient presents with    Follow-up     4 week f/u; image review        HPI 1/3/25:  Patient is a 54 year old female who returns to clinic for a hospital follow up of L4-5 discitis/osteomyelitis. 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 Presented initially to [place] seen by Dr. Mahmood and then again to [facility] consult to Dr. Martell, recommended repeat imaging and medical treatment with Infectious Disease. She has a right PICC and has been compliant with antibiotic regimen,  but is having itching pain and rash treated with benadryl and steroids per ID as well. She has an appointment with Dr. Brannon next week, but was told her infusions would likely continue through 1/19/25.    Regarding her back and leg pain, she has noticed significant improvement in her pain and mobility. She has been tolerating home health PT and is eager to transition to outpatient and return to the gym. She is having pain while sitting, no pain while laying down. Although, today is reportedly a good day and she has been able to sit upright with minimal discomfort. She denies fevers, chills, weakness. She is having numbness on the inside of her left foot, stable. Her right hip continues to be the most painful, but improving overall. She is walking with a walker. No bowel or bladder incontinence, no saddle anesthesia. She is taking percocet 10 prn, maybe 2 per day and sometimes breaking it in half. She is not in need of a refill at this time. She was seen by Dr. Mcdonald on 12/18/24 for a second opinion and he recommended increase her gabapentin for the left leg and foot pain, which has helped. He also ordered dynamic XR and CT of the lumbar spine which were performed, but not discussed.     Review of Systems    Constitutional:  Negative for chills and fever.   Musculoskeletal:  Positive for back pain and gait problem. Negative for neck pain and neck stiffness.   Neurological:  Positive for numbness. Negative for weakness.       Past Medical History:   Diagnosis Date    Arthritis     Breast cancer 2019    Cervical radiculopathy     Cervical spine disease     Chronic constipation     General anesthetics causing adverse effect in therapeutic use     Respiratory distress     sister    Sciatica      Social History     Socioeconomic History    Marital status: Single   Tobacco Use    Smoking status: Former     Current packs/day: 0.00     Types: Cigarettes     Quit date:      Years since quittin.0    Smokeless tobacco: Never   Substance and Sexual Activity    Alcohol use: Yes     Comment: socially    Drug use: No    Sexual activity: Yes     Partners: Male     Social Drivers of Health     Financial Resource Strain: Low Risk  (2024)    Overall Financial Resource Strain (CARDIA)     Difficulty of Paying Living Expenses: Not hard at all   Food Insecurity: No Food Insecurity (2024)    Hunger Vital Sign     Worried About Running Out of Food in the Last Year: Never true     Ran Out of Food in the Last Year: Never true   Transportation Needs: No Transportation Needs (2024)    TRANSPORTATION NEEDS     Transportation : No   Physical Activity: Inactive (12/3/2024)    Received from [facility]    Exercise Vital Sign     Days of Exercise per Week: 0 days     Minutes of Exercise per Session: 0 min   Stress: No Stress Concern Present (2024)    [employer] [place] [employer] - Occupational Stress Questionnaire     Feeling of Stress : Not at all   Housing Stability: Low Risk  (2024)    Housing Stability Vital Sign     Unable to Pay for Housing in the Last Year: No     Homeless in the Last Year: No     Family History   Problem Relation Name Age of Onset    Breast cancer  Sister  48    Diabetes Mother      Heart disease Mother      Emphysema Father      Diabetes Father       Review of patient's allergies indicates:   Allergen Reactions    Chlorhexidine Rash       Current Outpatient Medications:     cefTRIAXone (ROCEPHIN) 1 gram/50 mL IVPB, Inject 100 mLs (2 g total) into the vein every 12 (twelve) hours. PICC LINE, Disp: , Rfl:     docusate sodium (COLACE) 100 MG capsule, Take 100 mg by mouth 2 (two) times daily., Disp: , Rfl:     EPINEPHrine (EPIPEN 2-GISELLE) 0.3 mg/0.3 mL AtIn, Inject 0.3 mLs (0.3 mg total) into the muscle as needed (Anaphylaxis (severe allergic reaction))., Disp: 2 each, Rfl: 1    famotidine (PEPCID) 20 MG tablet, Take 1 tablet (20 mg total) by mouth 2 (two) times daily., Disp: 60 tablet, Rfl: 0    gabapentin (NEURONTIN) 300 MG capsule, Take 600 mg by mouth 3 (three) times daily., Disp: , Rfl:     Lactobacillus rhamnosus GG (CULTURELLE) 10 billion cell capsule, Take 1 capsule by mouth once daily., Disp: , Rfl:     oxyCODONE-acetaminophen (PERCOCET)  mg per tablet, Take 1 tablet by mouth 4 (four) times daily as needed for Pain., Disp: , Rfl:     SYNTHROID 75 mcg tablet, Take 75 mcg by mouth before breakfast., Disp: , Rfl:     vitamin D (VITAMIN D3) 1000 units Tab, Take 1,000 Units by mouth once daily., Disp: , Rfl:     polyethylene glycol (GLYCOLAX) 17 gram PwPk, Take 17 g by mouth once daily., Disp: , Rfl:     progesterone (PROMETRIUM) 200 MG capsule, Take 200 mg by mouth every evening., Disp: , Rfl:   No current facility-administered medications for this visit.    Facility-Administered Medications Ordered in Other Visits:     lactated ringers infusion, , Intravenous, Continuous, Piero Chin MD, Last Rate: 0 mL/hr at 06/07/19 1350, New Bag at 08/15/19 0645  Blood pressure 113/61, pulse 86, resp. rate 18, height 5' (1.524 m), weight 41.3 kg (91 lb 0.8 oz).      Neurological Exam  Mental Status  Awake, alert and oriented to person, place and  time.    Motor                                               Right                     Left   Iliopsoas                               5                          5   Quadriceps                           5                          5  Ankle dorsiflexor                   5                          5  Ankle plantar flexor              5                           5  Extensor hallucis longus      5                           5    Sensory  Left: Loss of sensation in the L5 dermatome.  Dullness medial left foot L5.    Reflexes                                            Right                      Left  Patellar                                2+                         2+  Achilles                                2+                         2+    Right pathological reflexes: Ankle clonus present. 2 beats.  Left pathological reflexes: Ankle clonus present. 1 beat.    Gait    Walker.      Physical Exam  Vitals and nursing note reviewed.   Constitutional:       General: She is not in acute distress.     Appearance: Normal appearance. She is not ill-appearing, toxic-appearing or diaphoretic.   Neurological:      Deep Tendon Reflexes:      Reflex Scores:       Patellar reflexes are 2+ on the right side and 2+ on the left side.       Achilles reflexes are 2+ on the right side and 2+ on the left side.        Vital Signs  Pulse: 86  Resp: 18  BP: 113/61  BP Location: Right arm  Patient Position: Sitting  Pain Score:   2  Pain Loc: Back  Height and Weight  Height: 5' (152.4 cm)  Weight: 41.3 kg (91 lb 0.8 oz)  BSA (Calculated - sq m): 1.32 sq meters  BMI (Calculated): 17.8  Weight in (lb) to have BMI = 25: 127.7]    Provider dictation:  All imaging is personally reviewed and discussed in detail with the patient.     MRI lumbar spine 1/3/25 reveals improvement in L4-5 enhancement, advanced disc degeneration; right psoas abscess resolved. Bilateral foraminal stenosis at L4-5.   XR lumbar spine 12/31/24 reveals no instability in dynamic  "views.  CT lumbar spine 12/31/24 reveals "severe facet arthropathy at L4-5 with widening of the joint spaces and associated erosive changes suggesting sequelae of septic arthropathy. No high-grade bony spinal canal stenosis at any lumbar level."    Patient is improving with ID medical treatment. Will have her return to clinic in 4-6 weeks for follow up of pain. ER precautions were discussed to include weakness, worsening numbness, tingling, bowel or bladder dysfunction, intractable pain. She would like to continue her care here rather than return to the [place]. All questions were answered.     Visit Diagnosis:  Discitis of lumbar region    Spinal stenosis, lumbosacral region        "

## 2025-01-08 ENCOUNTER — OFFICE VISIT (OUTPATIENT)
Dept: INFECTIOUS DISEASES | Facility: CLINIC | Age: 55
End: 2025-01-08
Payer: COMMERCIAL

## 2025-01-08 ENCOUNTER — TELEPHONE (OUTPATIENT)
Dept: INFECTIOUS DISEASES | Facility: CLINIC | Age: 55
End: 2025-01-08

## 2025-01-08 DIAGNOSIS — Z79.2 RECEIVING INTRAVENOUS ANTIBIOTIC TREATMENT AS OUTPATIENT: Primary | ICD-10-CM

## 2025-01-08 DIAGNOSIS — M46.26 OSTEOMYELITIS OF LUMBAR VERTEBRA: ICD-10-CM

## 2025-01-08 NOTE — TELEPHONE ENCOUNTER
New orders per Dr. Brannon:  call [facility] and change Ceftraixone for Ertapenem 1gr IV QD until 1/22/25  Spoke with Alley Franco at [facility].  This will be 1st time on Ertapenem, pharm will schedule first dose with patient.  Continue same weekly labs, PICC care, etc.     Follow up with ID PRN

## 2025-01-08 NOTE — PROGRESS NOTES
"The patient location is: Home  The chief complaint leading to consultation is: Follow up    Visit type: audiovisual    Face to Face time with patient: 10  30 minutes of total time spent on the encounter, which includes face to face time and non-face to face time preparing to see the patient (eg, review of tests), Obtaining and/or reviewing separately obtained history, Documenting clinical information in the electronic or other health record, Independently interpreting results (not separately reported) and communicating results to the patient/family/caregiver, or Care coordination (not separately reported).         Each patient to whom he or she provides medical services by telemedicine is:  (1) informed of the relationship between the physician and patient and the respective role of any other health care provider with respect to management of the patient; and (2) notified that he or she may decline to receive medical services by telemedicine and may withdraw from such care at any time.    Notes:     [facility]  Infectious Disease        Patient ID: Jenny Baugh is a 54 y.o. female.    Chief Complaint: Follow-up      Interval HPI:      12/:6 Patient evaluated at bedside with significant other.  Patient tells me that her pain is about the same compared to discharged however spasms have resolved.  Denies fever, chills, night sweats.  12/8:  Evaluated at bedside, patient tells me that she is feeling better compared to admission.  Evaluated by PT home with home health.  Receiving ceftriaxone and Bactrim.  01/08/2025:  Telehealth, ID clinic.  Patient tells me that she is feeling better but still complaining of back pain repeat MRI improved from previous.  Receiving ceftriaxone complaining of pruritus, feels poorly every time she gets it.    Assessment and Plan:     Jenny Avalos" was seen today for follow-up.    Diagnoses and all orders for this visit:    Receiving intravenous " antibiotic treatment as outpatient    Osteomyelitis of lumbar vertebra          # Diskitis of L4-L5 complicated with psoas abscess  and anterior phlegmon  - Diagnosed while in [place].  Microbiology suspected to be related to blood culture, E coli.  - 11/24: MRI:  L4/L5 diskitis osteomyelitis.  Anterior phlegmon and well-defined collection the right psoas muscle spanning 1.8 x 2.1 cm  - 12/5: MRI osteo diskitis at L4/L5. 1.6 x 1.4 x 1.6 cm peripherally enhancing fluid collection suspicious for small abscess abutting the right psoas   - Evaluated by Neurosurgery at [place] and again here in [facility]  recommended  medical management only  - Diskitis assumed to be secondary to E coli based on positive blood cultures.  Hence has been receiving ceftriaxone IV as well as Bactrim By Dr. Churchill  - CRP: 24 (Previous CRP a [place] with different ranges,  however does seem lower comparatively)  - Procalcitonin: Negative  - 12/08/2024: Discharged home on IV ceftriaxone and dalbavancin.    # CKD    Recommendations   - Complaining of ceftriaxone side effect.  We will discontinue   - Start Ertapenem IV 1 g daily   - Continue same plan as previous   - EOC:  01/22/2025   - Weekly CBC, CMP, CRP  - HH to remove line at the end of treatment   - RTC p.r.n.    HPI:      This is a 54-year-old female with previous medical history of chronic back pain  for which patient has received  epidural steroid injections in the past due to radiculopathy.  - It appears that patient has been complaining of lower back pain which patient was seen  by a provider who gave her Medrol Lam and steroid injection.   Patient was minimally symptomatic for a couple of weeks, unfortunately  symptoms recurred  reason for which patient was admitted in [facility] on 11/24/2024.    - During that hospitalization  MRI showed L4-L5 vertebral bodies concerning for diskitis and osteomyelitis associated phlegmon anterior to L4-L5 vertebral bodies. Right psoas fluid  collection noted concerning for developing abscess  - Neurosurgery was consulted and recommended medical management.  IR consulted  for percutaneous biopsy and drainage but apparently this was unsuccessful.  - Blood cultures were obtained positive for E coli   - As a result Infectious Disease was consulted, patient was seen by Dr. Churchill who recommended ceftriaxone 2gr IV BID  for 7 weeks and Bactrim DS  b.I.d. for 30 days   With weekly laboratories.    - Patient was discharged on 2024 and patient states that she could not perform her ADLs complaining of significant pain  - Patient did noticed that spasms have resolved.    - As a result patient came to this hospital on 2024 for evaluation and 2nd opinion.    Because of the above Infectious Diseases was consulted      Past Medical History:   Diagnosis Date    Arthritis     Breast cancer 2019    Cervical radiculopathy     Cervical spine disease     Chronic constipation     General anesthetics causing adverse effect in therapeutic use     Respiratory distress     sister    Sciatica        Past Surgical History:   Procedure Laterality Date    BREAST MASS EXCISION Left 2019    Procedure: EXCISION, MASS, BREAST/ 2:00 with Needle loc;  Surgeon: uLcia Lancaster MD;  Location: [facility];  Service: General;  Laterality: Left;    BREAST RECONSTRUCTION Bilateral 8/15/2019    Procedure: RECONSTRUCTION, BREAST - with Immediate Silicone Implants;  Surgeon: Thang White MD;  Location: [facility] OR;  Service: Plastics;  Laterality: Bilateral;     SECTION      EPIDURAL STEROID INJECTION INTO CERVICAL SPINE N/A 10/27/2021    Procedure: Injection-steroid-epidural-cervical to right;  Surgeon: Chris Fischer MD;  Location: [facility] OR;  Service: Pain Management;  Laterality: N/A;    EPIDURAL STEROID INJECTION INTO CERVICAL SPINE N/A 2021    Procedure: Injection-steroid-epidural-cervical to the right;  Surgeon: Chris Fischer MD;  Location:  [facility];  Service: Pain Management;  Laterality: N/A;    INJECTION OF ANESTHETIC AGENT AROUND MEDIAL BRANCH NERVES INNERVATING CERVICAL FACET JOINT Bilateral 2022    Procedure: Block-nerve-medial branch-cervical C4/5 & C5/6;  Surgeon: Chris Fischer MD;  Location: [facility] OR;  Service: Pain Management;  Laterality: Bilateral;    SENTINEL LYMPH NODE BIOPSY Left 8/15/2019    Procedure: BIOPSY, LYMPH NODE, SENTINEL;  Surgeon: Lucia Lancaster MD;  Location: [facility] OR;  Service: General;  Laterality: Left;    SIMPLE MASTECTOMY Bilateral 8/15/2019    Procedure: MASTECTOMY, SIMPLE - Nipple Sparing;  Surgeon: Lucia Lancaster MD;  Location: [facility] OR;  Service: General;  Laterality: Bilateral;       Family History   Problem Relation Name Age of Onset    Breast cancer Sister  48    Diabetes Mother      Heart disease Mother      Emphysema Father      Diabetes Father         Social History     Socioeconomic History    Marital status: Single   Tobacco Use    Smoking status: Former     Current packs/day: 0.00     Types: Cigarettes     Quit date:      Years since quittin.0    Smokeless tobacco: Never   Substance and Sexual Activity    Alcohol use: Yes     Comment: socially    Drug use: No    Sexual activity: Yes     Partners: Male     Social Drivers of Health     Financial Resource Strain: Low Risk  (2024)    Overall Financial Resource Strain (CARDIA)     Difficulty of Paying Living Expenses: Not hard at all   Food Insecurity: No Food Insecurity (2024)    Hunger Vital Sign     Worried About Running Out of Food in the Last Year: Never true     Ran Out of Food in the Last Year: Never true   Transportation Needs: No Transportation Needs (2024)    TRANSPORTATION NEEDS     Transportation : No   Physical Activity: Inactive (12/3/2024)    Received from [facility]    Exercise Vital Sign     Days of Exercise per Week: 0 days     Minutes of Exercise per Session: 0 min    Stress: No Stress Concern Present (12/6/2024)    [employer] [place] - Occupational Stress Questionnaire     Feeling of Stress : Not at all   Housing Stability: Low Risk  (12/6/2024)    Housing Stability Vital Sign     Unable to Pay for Housing in the Last Year: No     Homeless in the Last Year: No       Review of patient's allergies indicates:   Allergen Reactions    Chlorhexidine Rash       Current Outpatient Medications   Medication Instructions    cefTRIAXone (ROCEPHIN) 1 gram/50 mL IVPB 2 g, Intravenous, Every 12 hours, PICC LINE    docusate sodium (COLACE) 100 mg, 2 times daily    EPINEPHrine (EPIPEN 2-GISELLE) 0.3 mg, Intramuscular, As needed (PRN)    famotidine (PEPCID) 20 mg, Oral, 2 times daily    gabapentin (NEURONTIN) 600 mg, 3 times daily    Lactobacillus rhamnosus GG (CULTURELLE) 10 billion cell capsule 1 capsule, Daily    oxyCODONE-acetaminophen (PERCOCET)  mg per tablet 1 tablet, 4 times daily PRN    polyethylene glycol (GLYCOLAX) 17 g, Daily    progesterone (PROMETRIUM) 200 mg, Oral, Nightly    SYNTHROID 75 mcg, Before breakfast    vitamin D (VITAMIN D3) 1,000 Units, Daily         Review of Systems   Constitutional:  Negative for activity change, appetite change, chills, diaphoresis, fatigue, fever and unexpected weight change.   HENT:  Negative for sore throat.    Eyes:  Negative for photophobia and visual disturbance.   Respiratory:  Negative for cough and shortness of breath.    Cardiovascular:  Negative for chest pain and leg swelling.   Gastrointestinal:  Negative for abdominal distention and abdominal pain.   Genitourinary:  Negative for difficulty urinating.   Musculoskeletal:  Positive for back pain. Negative for arthralgias.   Skin:  Negative for color change and rash.   Allergic/Immunologic: Negative for immunocompromised state.   Neurological:  Positive for weakness. Negative for dizziness and headaches.   Psychiatric/Behavioral:  The patient is not  nervous/anxious.            Objective:     Temp:  [97.8 °F (36.6 °C)-98.6 °F (37 °C)] 98.6 °F (37 °C)  Pulse:  [] 100  Resp:  [16-18] 16  SpO2:  [95 %-100 %] 99 %  BP: ()/(55-65) 101/55         Physical Exam  HENT:      Head: Normocephalic.   Neurological:      General: No focal deficit present.      Mental Status: She is alert and oriented to person, place, and time.   Psychiatric:         Mood and Affect: Mood normal.         Behavior: Behavior normal.           CrCl cannot be calculated (Patient's most recent lab result is older than the maximum 7 days allowed.).    Recent Labs   Lab 12/07/24  0610 12/08/24  0444   WBC 4.15 4.84    280       Microbiology Results (last 7 days)       ** No results found for the last 168 hours. **              Significant Labs: All pertinent labs within the past 24 hours have been reviewed.     Significant Imaging: I have reviewed all relevant and available imaging results/findings within the past 24 hours.      Plan -- see top of note

## 2025-01-09 ENCOUNTER — TELEPHONE (OUTPATIENT)
Dept: NEUROSURGERY | Facility: CLINIC | Age: 55
End: 2025-01-09
Payer: COMMERCIAL

## 2025-01-09 DIAGNOSIS — M46.46 DISCITIS OF LUMBAR REGION: Primary | ICD-10-CM

## 2025-01-09 RX ORDER — GABAPENTIN 600 MG/1
600 TABLET ORAL 3 TIMES DAILY
Qty: 270 TABLET | Refills: 0 | Status: SHIPPED | OUTPATIENT
Start: 2025-01-09 | End: 2025-04-09

## 2025-01-09 RX ORDER — OXYCODONE AND ACETAMINOPHEN 10; 325 MG/1; MG/1
1 TABLET ORAL EVERY 6 HOURS PRN
Qty: 28 TABLET | Refills: 0 | Status: SHIPPED | OUTPATIENT
Start: 2025-01-09 | End: 2025-01-16

## 2025-01-09 NOTE — TELEPHONE ENCOUNTER
----- Message from Blaze sent at 1/9/2025  2:46 PM CST -----  Regarding: Refill request  Type:  RX Refill Request    Who Called: PT  Refill or New Rx:refill    RX Name and Strength:gabapentin (NEURONTIN) 300 MG capsule   oxyCODONE-acetaminophen (PERCOCET)  mg per tablet     How is the patient currently taking it? (ex. 1XDay): 2 capsule 3 times a day  (gabapentin)  Is this a 30 day or 90 day RX:30    Preferred Pharmacy with phone number:  [facility]  [place], [place] - [address]  [address]  [place] [address]  Phone: [phone] Fax: [phone]        Local or Mail Order:local  Ordering Provider: historical    Would the patient rather a call back or a response via [facility]? Call back    Best Call Back Number:[phone]      Additional Information: sts that at appt it was agreed that her rx should be the upped dosage that she was placed on however due to the increase she will run out before her next refill is due and needs and new prescription called in and was hoping it can be done by the office as she will stay with care in the office for future visits. Sts that the 2/ 3xday does help.    Also wants to know if she can get her oxyCODONE-acetaminophen (PERCOCET)  mg per tablet  Refilled as said that it was discussed at the appt as well as she only has 3 pills left and her Primary hasn't replied back.    Please advise -- Thank you

## 2025-01-14 DIAGNOSIS — M46.46 DISCITIS OF LUMBAR REGION: Primary | ICD-10-CM

## 2025-01-17 ENCOUNTER — TELEPHONE (OUTPATIENT)
Dept: INFECTIOUS DISEASES | Facility: CLINIC | Age: 55
End: 2025-01-17
Payer: COMMERCIAL

## 2025-01-17 NOTE — TELEPHONE ENCOUNTER
----- Message from Angélica sent at 1/17/2025 10:32 AM CST -----  Type: Needs Medical Advice    Who Called:  Ny - [facility]    Symptoms (please be specific):  na  How long has patient had these symptoms:  dorothy    Pharmacy name and phone #:  [facility]    Best Call Back Number: [phone] -  Ny  Additional Information: Pt is getting last does of IV antibiotic on 1/22. Need an authorization to order the removal of her PIC line after that  Please call back to advise, thank you!

## 2025-01-17 NOTE — TELEPHONE ENCOUNTER
Returned call to Ny at [facility]  Received order for IV ertap on 1/8/25, started iv abx on 1/10/25, EOC changed from 1/22/25 to 1/24/25 for 14 days of treatment, confirmed with Ny.  HH to d/c PICC after last dose or [facility] can d/c if HH does not per Dr. Brannon's 1/8/25 proress note

## 2025-01-28 ENCOUNTER — TELEPHONE (OUTPATIENT)
Dept: INFECTIOUS DISEASES | Facility: CLINIC | Age: 55
End: 2025-01-28
Payer: COMMERCIAL

## 2025-01-28 NOTE — TELEPHONE ENCOUNTER
Returned call to patient who states she completed IV abx last week and thought discussion was to be on oral abx following IV abx, also no follow up.  Discussed follow up is PRN, as needed.  Pt states she is feeling well, doing fine.  Following Dr. Mello.    Will defer question of oral abx to Dr. Brannon    Will let patient know outcome

## 2025-01-29 PROBLEM — R29.3 POSTURAL IMBALANCE: Status: ACTIVE | Noted: 2025-01-29

## 2025-01-29 PROBLEM — M62.81 MUSCLE WEAKNESS OF LOWER EXTREMITY: Status: ACTIVE | Noted: 2025-01-29

## 2025-01-29 PROBLEM — R29.898 WEAKNESS OF BACK: Status: ACTIVE | Noted: 2025-01-29

## 2025-01-29 PROBLEM — M53.86 DECREASED RANGE OF MOTION OF INTERVERTEBRAL DISCS OF LUMBAR SPINE: Status: ACTIVE | Noted: 2025-01-29

## 2025-01-29 NOTE — TELEPHONE ENCOUNTER
Per Dr. Brannon - called pt, discussed that the has received a prolonged antibiotic course with normalization of the inflammatory markers (blood work). Also, no discussion regarding abx suppression following IV abx, of which during that course she states she was taking oral abx well. Patient has f/u with Dr. Martell in February.    All questions answered to pt's satisfaction.  Patient encouraged to call back with any questions/concerns.

## 2025-01-29 NOTE — TELEPHONE ENCOUNTER
Please call pt. I dont see in any of our plans that we discussed suppression. She received a prolonged abx course with normalization of her inflammatory markers

## 2025-02-05 ENCOUNTER — DOCUMENT SCAN (OUTPATIENT)
Dept: HOME HEALTH SERVICES | Facility: HOSPITAL | Age: 55
End: 2025-02-05
Payer: COMMERCIAL

## 2025-02-11 ENCOUNTER — OFFICE VISIT (OUTPATIENT)
Dept: NEUROSURGERY | Facility: CLINIC | Age: 55
End: 2025-02-11
Payer: COMMERCIAL

## 2025-02-11 VITALS
BODY MASS INDEX: 17.88 KG/M2 | RESPIRATION RATE: 18 BRPM | WEIGHT: 91.06 LBS | DIASTOLIC BLOOD PRESSURE: 73 MMHG | HEIGHT: 60 IN | SYSTOLIC BLOOD PRESSURE: 108 MMHG

## 2025-02-11 DIAGNOSIS — M48.07 SPINAL STENOSIS, LUMBOSACRAL REGION: Primary | ICD-10-CM

## 2025-02-11 DIAGNOSIS — M46.46 DISCITIS OF LUMBAR REGION: ICD-10-CM

## 2025-02-11 PROCEDURE — 3074F SYST BP LT 130 MM HG: CPT | Mod: CPTII,S$GLB,, | Performed by: NEUROLOGICAL SURGERY

## 2025-02-11 PROCEDURE — 3008F BODY MASS INDEX DOCD: CPT | Mod: CPTII,S$GLB,, | Performed by: NEUROLOGICAL SURGERY

## 2025-02-11 PROCEDURE — 1159F MED LIST DOCD IN RCRD: CPT | Mod: CPTII,S$GLB,, | Performed by: NEUROLOGICAL SURGERY

## 2025-02-11 PROCEDURE — 3078F DIAST BP <80 MM HG: CPT | Mod: CPTII,S$GLB,, | Performed by: NEUROLOGICAL SURGERY

## 2025-02-11 PROCEDURE — 99214 OFFICE O/P EST MOD 30 MIN: CPT | Mod: S$GLB,,, | Performed by: NEUROLOGICAL SURGERY

## 2025-02-11 NOTE — PROGRESS NOTES
"Neurosurgery History and Physical    Patient ID: Jenny Baugh is a 54 y.o. female.    Chief Complaint   Patient presents with    Follow-up     F/u from pain     HPI 2/11/25:  Patient returns to clinic for a follow up of her L4-5 discitis. She still has some soreness and stiffness in her back. She is having some "floppy foot" of the left ankle and it will slap on the ground while walking. She is no longer needing a walker for ambulation.  She finished her IV antibiotics and was discharged from infectious disease.     HPI 1/3/25:  Patient is a 54 year old female who returns to clinic for a hospital follow up of L4-5 discitis/osteomyelitis. 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 Presented initially to [place] seen by Dr. Mahmood and then again to [facility] consult to Dr. Martell, recommended repeat imaging and medical treatment with Infectious Disease. She has a right PICC and has been compliant with antibiotic regimen,  but is having itching pain and rash treated with benadryl and steroids per ID as well. She has an appointment with Dr. Brannon next week, but was told her infusions would likely continue through 1/19/25.    Regarding her back and leg pain, she has noticed significant improvement in her pain and mobility. She has been tolerating home health PT and is eager to transition to outpatient and return to the gym. She is having pain while sitting, no pain while laying down. Although, today is reportedly a good day and she has been able to sit upright with minimal discomfort. She denies fevers, chills, weakness. She is having numbness on the inside of her left foot, stable. Her right hip continues to be the most painful, but improving overall. She is walking with a walker. No bowel or bladder incontinence, no saddle anesthesia. She is taking percocet 10 prn, maybe 2 per day and " sometimes breaking it in half. She is not in need of a refill at this time. She was seen by Dr. Mcdonald on 24 for a second opinion and he recommended increase her gabapentin for the left leg and foot pain, which has helped. He also ordered dynamic XR and CT of the lumbar spine which were performed, but not discussed.     Review of Systems   Constitutional:  Negative for chills and fever.   Musculoskeletal:  Positive for back pain and gait problem. Negative for neck pain and neck stiffness.   Neurological:  Positive for numbness. Negative for weakness.       Past Medical History:   Diagnosis Date    Arthritis     Breast cancer 2019    Cervical radiculopathy     Cervical spine disease     Chronic constipation     General anesthetics causing adverse effect in therapeutic use     Respiratory distress     sister    Sciatica      Social History     Socioeconomic History    Marital status: Single   Tobacco Use    Smoking status: Former     Current packs/day: 0.00     Types: Cigarettes     Quit date:      Years since quittin.1    Smokeless tobacco: Never   Substance and Sexual Activity    Alcohol use: Yes     Comment: socially    Drug use: No    Sexual activity: Yes     Partners: Male     Social Drivers of Health     Financial Resource Strain: Low Risk  (2024)    Overall Financial Resource Strain (CARDIA)     Difficulty of Paying Living Expenses: Not hard at all   Food Insecurity: No Food Insecurity (2024)    Hunger Vital Sign     Worried About Running Out of Food in the Last Year: Never true     Ran Out of Food in the Last Year: Never true   Transportation Needs: No Transportation Needs (2024)    TRANSPORTATION NEEDS     Transportation : No   Physical Activity: Inactive (12/3/2024)    Received from [facility]    Exercise Vital Sign     Days of Exercise per Week: 0 days     Minutes of Exercise per Session: 0 min   Stress: No Stress Concern Present (2024)     [facility] of Occupational Health - Occupational Stress Questionnaire     Feeling of Stress : Not at all   Housing Stability: Unknown (12/6/2024)    Housing Stability Vital Sign     Unable to Pay for Housing in the Last Year: No     Homeless in the Last Year: No     Family History   Problem Relation Name Age of Onset    Breast cancer Sister  48    Diabetes Mother      Heart disease Mother      Emphysema Father      Diabetes Father       Review of patient's allergies indicates:   Allergen Reactions    Chlorhexidine Rash       Current Outpatient Medications:     EPINEPHrine (EPIPEN 2-GISELLE) 0.3 mg/0.3 mL AtIn, Inject 0.3 mLs (0.3 mg total) into the muscle as needed (Anaphylaxis (severe allergic reaction))., Disp: 2 each, Rfl: 1    gabapentin (NEURONTIN) 600 MG tablet, Take 1 tablet (600 mg total) by mouth 3 (three) times daily., Disp: 270 tablet, Rfl: 0    SYNTHROID 75 mcg tablet, Take 75 mcg by mouth before breakfast., Disp: , Rfl:     vitamin D (VITAMIN D3) 1000 units Tab, Take 1,000 Units by mouth once daily., Disp: , Rfl:     docusate sodium (COLACE) 100 MG capsule, Take 100 mg by mouth 2 (two) times daily., Disp: , Rfl:     famotidine (PEPCID) 20 MG tablet, Take 1 tablet (20 mg total) by mouth 2 (two) times daily., Disp: 60 tablet, Rfl: 0    Lactobacillus rhamnosus GG (CULTURELLE) 10 billion cell capsule, Take 1 capsule by mouth once daily., Disp: , Rfl:     polyethylene glycol (GLYCOLAX) 17 gram PwPk, Take 17 g by mouth once daily., Disp: , Rfl:     progesterone (PROMETRIUM) 200 MG capsule, Take 200 mg by mouth every evening., Disp: , Rfl:   No current facility-administered medications for this visit.    Facility-Administered Medications Ordered in Other Visits:     lactated ringers infusion, , Intravenous, Continuous, Piero Chin MD, Last Rate: 0 mL/hr at 06/07/19 1350, New Bag at 08/15/19 0645  Blood pressure 108/73, resp. rate 18, height 5' (1.524 m), weight 41.3 kg (91 lb 0.8  oz).      Neurological Exam  Mental Status  Awake, alert and oriented to person, place and time.    Motor                                               Right                     Left  Ankle inversion                      5                          5  Ankle eversion                       5                          5                                             Right                     Left  Deltoid                                   5                          5   Biceps                                   5                          5   Triceps                                  5                          5   Wrist flexor                            5                          5   Wrist extensor                       5                          5   Interossei                              5                          5   Iliopsoas                               5                          5   Quadriceps                           5                          5  Ankle dorsiflexor                   5                          5  Ankle plantar flexor              5                           5  Extensor hallucis longus      5                           5    Sensory  Dullness medial left foot L5    Tenderness at left fibular head  Numbness peroneal nerve distribution .    Reflexes                                            Right                      Left  Biceps                                 2+                         2+  Triceps                                3+                         3+  Patellar                                2+                         2+  Achilles                                2+                         2+  Right Plantar: downgoing  Left Plantar: downgoing    Right pathological reflexes: Linda's absent. Ankle clonus present. 2 beats.  Left pathological reflexes: Linda's absent. Ankle clonus present. 2 beat.    Gait  Casual gait is normal including stance, stride, and arm swing.  Ambulates without  assistance.      Physical Exam  Vitals and nursing note reviewed.   Neurological:      Deep Tendon Reflexes:      Reflex Scores:       Tricep reflexes are 3+ on the right side and 3+ on the left side.       Bicep reflexes are 2+ on the right side and 2+ on the left side.       Patellar reflexes are 2+ on the right side and 2+ on the left side.       Achilles reflexes are 2+ on the right side and 2+ on the left side.        Vital Signs  Resp: 18  BP: 108/73  BP Location: Right arm  Patient Position: Sitting  Height and Weight  Height: 5' (152.4 cm)  Weight: 41.3 kg (91 lb 0.8 oz)  BSA (Calculated - sq m): 1.32 sq meters  BMI (Calculated): 17.8  Weight in (lb) to have BMI = 25: 127.7]    Provider dictation:  All imaging was reviewed by Dr. Martell and discussed with the patient in detail.     MRI lumbar spine 1/3/25 reveals improvement in L4-5 enhancement, advanced disc degeneration; right psoas abscess resolved. Bilateral foraminal stenosis at L4-5.   XR lumbar spine 12/31/24 reveals no instability in dynamic views, lordosis reasonable despite flattening with autofusion of L4-5.   CT lumbar spine 12/31/24 reveals early fusion bony bridging of L4-5    Patient has completed infectious disease protocol and imaging is reassuring in setting of pain improved. Continue with PT for strengthening. Will return to clinic with a CT lumbar spine in 2 months for follow up to assess for healing and potential fusion.     Visit Diagnosis:  Spinal stenosis, lumbosacral region    Discitis of lumbar region

## 2025-02-20 ENCOUNTER — PATIENT MESSAGE (OUTPATIENT)
Dept: NEUROSURGERY | Facility: CLINIC | Age: 55
End: 2025-02-20
Payer: COMMERCIAL

## 2025-02-21 ENCOUNTER — DOCUMENT SCAN (OUTPATIENT)
Dept: HOME HEALTH SERVICES | Facility: HOSPITAL | Age: 55
End: 2025-02-21
Payer: COMMERCIAL

## 2025-02-24 ENCOUNTER — DOCUMENT SCAN (OUTPATIENT)
Dept: HOME HEALTH SERVICES | Facility: HOSPITAL | Age: 55
End: 2025-02-24
Payer: COMMERCIAL

## 2025-03-05 ENCOUNTER — DOCUMENT SCAN (OUTPATIENT)
Dept: HOME HEALTH SERVICES | Facility: HOSPITAL | Age: 55
End: 2025-03-05
Payer: COMMERCIAL

## 2025-03-17 ENCOUNTER — PATIENT MESSAGE (OUTPATIENT)
Dept: NEUROSURGERY | Facility: CLINIC | Age: 55
End: 2025-03-17
Payer: COMMERCIAL

## 2025-03-25 ENCOUNTER — DOCUMENT SCAN (OUTPATIENT)
Dept: HOME HEALTH SERVICES | Facility: HOSPITAL | Age: 55
End: 2025-03-25
Payer: COMMERCIAL

## 2025-03-25 PROBLEM — R29.3 POSTURAL IMBALANCE: Status: RESOLVED | Noted: 2025-01-29 | Resolved: 2025-03-25

## 2025-03-25 PROBLEM — R29.898 WEAKNESS OF BACK: Status: RESOLVED | Noted: 2025-01-29 | Resolved: 2025-03-25

## 2025-03-25 PROBLEM — M53.86 DECREASED RANGE OF MOTION OF INTERVERTEBRAL DISCS OF LUMBAR SPINE: Status: RESOLVED | Noted: 2025-01-29 | Resolved: 2025-03-25

## 2025-03-25 PROBLEM — M62.81 MUSCLE WEAKNESS OF LOWER EXTREMITY: Status: RESOLVED | Noted: 2025-01-29 | Resolved: 2025-03-25

## 2025-04-14 ENCOUNTER — OFFICE VISIT (OUTPATIENT)
Dept: NEUROSURGERY | Facility: CLINIC | Age: 55
End: 2025-04-14
Payer: COMMERCIAL

## 2025-04-14 VITALS
WEIGHT: 91.06 LBS | SYSTOLIC BLOOD PRESSURE: 106 MMHG | HEART RATE: 82 BPM | BODY MASS INDEX: 17.88 KG/M2 | RESPIRATION RATE: 18 BRPM | HEIGHT: 60 IN | DIASTOLIC BLOOD PRESSURE: 65 MMHG

## 2025-04-14 DIAGNOSIS — M46.46 DISCITIS OF LUMBAR REGION: Primary | ICD-10-CM

## 2025-04-14 PROCEDURE — 1159F MED LIST DOCD IN RCRD: CPT | Mod: CPTII,S$GLB,, | Performed by: NEUROLOGICAL SURGERY

## 2025-04-14 PROCEDURE — 99999 PR PBB SHADOW E&M-EST. PATIENT-LVL III: CPT | Mod: PBBFAC,,, | Performed by: NEUROLOGICAL SURGERY

## 2025-04-14 PROCEDURE — 3078F DIAST BP <80 MM HG: CPT | Mod: CPTII,S$GLB,, | Performed by: NEUROLOGICAL SURGERY

## 2025-04-14 PROCEDURE — 99214 OFFICE O/P EST MOD 30 MIN: CPT | Mod: S$GLB,,, | Performed by: NEUROLOGICAL SURGERY

## 2025-04-14 PROCEDURE — 3008F BODY MASS INDEX DOCD: CPT | Mod: CPTII,S$GLB,, | Performed by: NEUROLOGICAL SURGERY

## 2025-04-14 PROCEDURE — 3074F SYST BP LT 130 MM HG: CPT | Mod: CPTII,S$GLB,, | Performed by: NEUROLOGICAL SURGERY

## 2025-04-14 RX ORDER — SPIRONOLACTONE 100 MG/1
1 TABLET, FILM COATED ORAL 2 TIMES DAILY
COMMUNITY
Start: 2024-12-09

## 2025-04-14 NOTE — PROGRESS NOTES
"Neurosurgery History and Physical    Patient ID: Jenny Baugh is a 54 y.o. female.    Chief Complaint   Patient presents with    Follow-up     Imaging f/u     HPI 2/11/25:  Patient returns to clinic for a follow up of her L4-5 discitis. She still has some soreness and stiffness in her back. She is having some "floppy foot" of the left ankle and it will slap on the ground while walking. She is no longer needing a walker for ambulation.  She finished her IV antibiotics and was discharged from infectious disease.     HPI 1/3/25:  Patient is a 54 year old female who returns to clinic for a hospital follow up of L4-5 discitis/osteomyelitis. 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 Presented initially to [place] seen by Dr. Mahmood and then [facility] Dr[facility] Martell, recommended repeat imaging and medical treatment with Infectious Disease. She has a right PICC and has been compliant with antibiotic regimen,  but is having itching pain and rash treated with benadryl and steroids per ID as well. She has an appointment with Dr. Brannon next week, but was told her infusions would likely continue through 1/19/25.    Regarding her back and leg pain, she has noticed significant improvement in her pain and mobility. She has been tolerating home health PT and is eager to transition to outpatient and return to the gym. She is having pain while sitting, no pain while laying down. Although, today is reportedly a good day and she has been able to sit upright with minimal discomfort. She denies fevers, chills, weakness. She is having numbness on the inside of her left foot, stable. Her right hip continues to be the most painful, but improving overall. She is walking with a walker. No bowel or bladder incontinence, no saddle anesthesia. She is taking percocet 10 prn, maybe 2 per day and " sometimes breaking it in half. She is not in need of a refill at this time. She was seen by Dr. Mcdonald on 24 for a second opinion and he recommended increase her gabapentin for the left leg and foot pain, which has helped. He also ordered dynamic XR and CT of the lumbar spine which were performed, but not discussed.     Review of Systems   Constitutional:  Negative for chills and fever.   Musculoskeletal:  Negative for back pain, gait problem, neck pain and neck stiffness.   Neurological:  Positive for numbness. Negative for weakness.       Past Medical History:   Diagnosis Date    Arthritis     Breast cancer 2019    Cervical radiculopathy     Cervical spine disease     Chronic constipation     General anesthetics causing adverse effect in therapeutic use     Respiratory distress     sister    Sciatica      Social History     Socioeconomic History    Marital status: Single   Tobacco Use    Smoking status: Former     Current packs/day: 0.00     Types: Cigarettes     Quit date:      Years since quittin.2    Smokeless tobacco: Never   Substance and Sexual Activity    Alcohol use: Yes     Comment: socially    Drug use: No    Sexual activity: Yes     Partners: Male     Social Drivers of Health     Financial Resource Strain: Low Risk  (2024)    Overall Financial Resource Strain (CARDIA)     Difficulty of Paying Living Expenses: Not hard at all   Food Insecurity: No Food Insecurity (2024)    Hunger Vital Sign     Worried About Running Out of Food in the Last Year: Never true     Ran Out of Food in the Last Year: Never true   Transportation Needs: No Transportation Needs (2024)    TRANSPORTATION NEEDS     Transportation : No   Physical Activity: Inactive (12/3/2024)    Received from [facility]    Exercise Vital Sign     Days of Exercise per Week: 0 days     Minutes of Exercise per Session: 0 min   Stress: No Stress Concern Present (2024)    [employer] - Occupational Stress Questionnaire     Feeling of Stress : Not at all   Housing Stability: Unknown (12/6/2024)    Housing Stability Vital Sign     Unable to Pay for Housing in the Last Year: No     Homeless in the Last Year: No     Family History   Problem Relation Name Age of Onset    Breast cancer Sister  48    Diabetes Mother      Heart disease Mother      Emphysema Father      Diabetes Father       Review of patient's allergies indicates:   Allergen Reactions    Chlorhexidine Rash       Current Outpatient Medications:     EPINEPHrine (EPIPEN 2-GISELLE) 0.3 mg/0.3 mL AtIn, Inject 0.3 mLs (0.3 mg total) into the muscle as needed (Anaphylaxis (severe allergic reaction))., Disp: 2 each, Rfl: 1    gabapentin (NEURONTIN) 600 MG tablet, Take 1 tablet (600 mg total) by mouth 3 (three) times daily. (Patient taking differently: Take 300 mg by mouth every evening.), Disp: 270 tablet, Rfl: 0    spironolactone (ALDACTONE) 100 MG tablet, Take 1 tablet by mouth 2 (two) times daily., Disp: , Rfl:     SYNTHROID 75 mcg tablet, Take 75 mcg by mouth before breakfast., Disp: , Rfl:     docusate sodium (COLACE) 100 MG capsule, Take 100 mg by mouth 2 (two) times daily., Disp: , Rfl:     famotidine (PEPCID) 20 MG tablet, Take 1 tablet (20 mg total) by mouth 2 (two) times daily., Disp: 60 tablet, Rfl: 0    Lactobacillus rhamnosus GG (CULTURELLE) 10 billion cell capsule, Take 1 capsule by mouth once daily., Disp: , Rfl:     polyethylene glycol (GLYCOLAX) 17 gram PwPk, Take 17 g by mouth once daily. (Patient not taking: Reported on 4/14/2025), Disp: , Rfl:     progesterone (PROMETRIUM) 200 MG capsule, Take 200 mg by mouth every evening., Disp: , Rfl:     vitamin D (VITAMIN D3) 1000 units Tab, Take 1,000 Units by mouth once daily. (Patient not taking: Reported on 4/14/2025), Disp: , Rfl:   No current facility-administered medications for this visit.    Facility-Administered Medications Ordered in Other Visits:      lactated ringers infusion, , Intravenous, Continuous, Piero Chin MD, Last Rate: 0 mL/hr at 06/07/19 1350, New Bag at 08/15/19 0645  Blood pressure 106/65, pulse 82, resp. rate 18, height 5' (1.524 m), weight 41.3 kg (91 lb 0.8 oz).      Neurological Exam  Mental Status  Awake, alert and oriented to person, place and time.    Motor                                               Right                     Left   Shoulder abduction               5                          5  Elbow flexion                         5                          5  Elbow extension                    5                          5  Wrist flexion                           5                          5  Wrist extension                      5                          5  Hip flexion                              5                          5  Knee extension                      5                          5  Plantarflexion                         5                          5  Dorsiflexion                            5                          5  Toe extension                        5                          5    Sensory  Dullness medial left foot L5.    Reflexes                                            Right                      Left  Biceps                                 2+                         2+  Triceps                                3+                         3+  Patellar                                2+                         2+  Achilles                                2+                         2+  Right Plantar: downgoing  Left Plantar: downgoing    Right pathological reflexes: Linda's absent. Ankle clonus present. 2 beats.  Left pathological reflexes: Linda's absent. Ankle clonus present. 2 beat.    Gait  Casual gait is normal including stance, stride, and arm swing.  Ambulates without assistance.      Physical Exam  Vitals and nursing note reviewed.   Neurological:      Deep Tendon Reflexes:      Reflex Scores:        Tricep reflexes are 3+ on the right side and 3+ on the left side.       Bicep reflexes are 2+ on the right side and 2+ on the left side.       Patellar reflexes are 2+ on the right side and 2+ on the left side.       Achilles reflexes are 2+ on the right side and 2+ on the left side.        Vital Signs  Pulse: 82  Resp: 18  BP: 106/65  BP Location: Right arm  Patient Position: Sitting  Pain Score: 0-No pain  Pain Loc: Back  Height and Weight  Height: 5' (152.4 cm)  Weight: 41.3 kg (91 lb 0.8 oz)  BSA (Calculated - sq m): 1.32 sq meters  BMI (Calculated): 17.8  Weight in (lb) to have BMI = 25: 127.7]    Provider dictation:  Personally reviewed and interpreted CT shows solid fusion at L4-L5 with no acute findings.    Doing well. No back pain, just stiffness. Subjective left ankle dorsiflexion weakness improved. Still has some neuropathy in dorsum of left foot and great tow that comes and goes with no triggers.    Exam full strength.      No further routine F/U needed. May F/U PRN.    Visit Diagnosis:  Discitis of lumbar region

## 2025-04-21 PROBLEM — S46.111D RUPTURE LONG HEAD BICEPS TENDON, RIGHT, SUBSEQUENT ENCOUNTER: Status: ACTIVE | Noted: 2025-04-21

## 2025-08-04 ENCOUNTER — PATIENT MESSAGE (OUTPATIENT)
Dept: NEUROSURGERY | Facility: CLINIC | Age: 55
End: 2025-08-04

## (undated) DEVICE — MARKER SKIN STND TIP BLUE BARR

## (undated) DEVICE — APPLICATOR CHLORAPREP CLR 10.5

## (undated) DEVICE — TRAY NERVE BLOCK

## (undated) DEVICE — NDL HYPO REG 25G X 1 1/2

## (undated) DEVICE — GLOVE SURGICAL LATEX SZ 7

## (undated) DEVICE — SYR GLASS 5CC LUER LOK

## (undated) DEVICE — NDL TUOHY EPIDURAL 20G X 3.5

## (undated) DEVICE — TOWEL OR DISP STRL BLUE 4/PK